# Patient Record
Sex: FEMALE | Race: BLACK OR AFRICAN AMERICAN | Employment: OTHER | URBAN - METROPOLITAN AREA
[De-identification: names, ages, dates, MRNs, and addresses within clinical notes are randomized per-mention and may not be internally consistent; named-entity substitution may affect disease eponyms.]

---

## 2017-04-17 RX ORDER — METOPROLOL TARTRATE 25 MG/1
TABLET, FILM COATED ORAL
Qty: 90 TAB | Refills: 6 | Status: SHIPPED | OUTPATIENT
Start: 2017-04-17 | End: 2017-04-19 | Stop reason: SDUPTHER

## 2017-04-19 RX ORDER — METOPROLOL TARTRATE 25 MG/1
TABLET, FILM COATED ORAL
Qty: 90 TAB | Refills: 3 | Status: SHIPPED | OUTPATIENT
Start: 2017-04-19 | End: 2017-07-06 | Stop reason: SDUPTHER

## 2017-06-26 RX ORDER — IRBESARTAN AND HYDROCHLOROTHIAZIDE 150; 12.5 MG/1; MG/1
TABLET, FILM COATED ORAL
Qty: 30 TAB | Refills: 0 | Status: SHIPPED | OUTPATIENT
Start: 2017-06-26 | End: 2017-07-06 | Stop reason: SDUPTHER

## 2017-06-26 NOTE — TELEPHONE ENCOUNTER
Contact # is [phone]    Patient's daughter, checking on status of patient's medication being filled. Scheduled appointment on July 6th to establish care with Dr Carina Amaya. Patient is completely out. Original request sent over on June 16th.

## 2017-07-05 PROBLEM — F02.80 ALZHEIMER'S DEMENTIA (HCC): Status: ACTIVE | Noted: 2017-07-05

## 2017-07-05 PROBLEM — G30.9 ALZHEIMER'S DEMENTIA (HCC): Status: ACTIVE | Noted: 2017-07-05

## 2017-07-05 PROBLEM — I65.23 CAROTID STENOSIS, BILATERAL: Status: ACTIVE | Noted: 2017-07-05

## 2017-07-06 ENCOUNTER — OFFICE VISIT (OUTPATIENT)
Dept: FAMILY MEDICINE CLINIC | Age: 82
End: 2017-07-06

## 2017-07-06 ENCOUNTER — HOSPITAL ENCOUNTER (OUTPATIENT)
Dept: LAB | Age: 82
Discharge: HOME | End: 2017-07-06
Payer: MEDICARE

## 2017-07-06 VITALS
WEIGHT: 100.2 LBS | BODY MASS INDEX: 18.44 KG/M2 | DIASTOLIC BLOOD PRESSURE: 80 MMHG | RESPIRATION RATE: 16 BRPM | OXYGEN SATURATION: 98 % | HEIGHT: 62 IN | HEART RATE: 64 BPM | SYSTOLIC BLOOD PRESSURE: 160 MMHG | TEMPERATURE: 98 F

## 2017-07-06 DIAGNOSIS — Z13.5 GLAUCOMA SCREENING: ICD-10-CM

## 2017-07-06 DIAGNOSIS — E03.9 UNSPECIFIED HYPOTHYROIDISM: ICD-10-CM

## 2017-07-06 DIAGNOSIS — R73.02 GLUCOSE INTOLERANCE (IMPAIRED GLUCOSE TOLERANCE): ICD-10-CM

## 2017-07-06 DIAGNOSIS — I65.23 CAROTID STENOSIS, BILATERAL: ICD-10-CM

## 2017-07-06 DIAGNOSIS — M81.0 OSTEOPOROSIS WITHOUT CURRENT PATHOLOGICAL FRACTURE, UNSPECIFIED OSTEOPOROSIS TYPE: ICD-10-CM

## 2017-07-06 DIAGNOSIS — I10 HYPERTENSION, UNCONTROLLED: ICD-10-CM

## 2017-07-06 DIAGNOSIS — D50.9 MICROCYTIC ANEMIA: ICD-10-CM

## 2017-07-06 DIAGNOSIS — R63.6 UNDERWEIGHT: ICD-10-CM

## 2017-07-06 DIAGNOSIS — F02.80 LATE ONSET ALZHEIMER'S DISEASE WITHOUT BEHAVIORAL DISTURBANCE (HCC): ICD-10-CM

## 2017-07-06 DIAGNOSIS — G30.1 LATE ONSET ALZHEIMER'S DISEASE WITHOUT BEHAVIORAL DISTURBANCE (HCC): ICD-10-CM

## 2017-07-06 DIAGNOSIS — N18.30 CHRONIC RENAL INSUFFICIENCY, STAGE III (MODERATE) (HCC): ICD-10-CM

## 2017-07-06 DIAGNOSIS — Z00.00 MEDICARE ANNUAL WELLNESS VISIT, SUBSEQUENT: Primary | ICD-10-CM

## 2017-07-06 LAB — RETICS/RBC NFR AUTO: 0.7 % (ref 0.6–2.6)

## 2017-07-06 PROCEDURE — 36415 COLL VENOUS BLD VENIPUNCTURE: CPT

## 2017-07-06 PROCEDURE — 85025 COMPLETE CBC W/AUTO DIFF WBC: CPT

## 2017-07-06 PROCEDURE — 80061 LIPID PANEL: CPT

## 2017-07-06 PROCEDURE — 83550 IRON BINDING TEST: CPT

## 2017-07-06 PROCEDURE — 80053 COMPREHEN METABOLIC PANEL: CPT

## 2017-07-06 PROCEDURE — 83036 HEMOGLOBIN GLYCOSYLATED A1C: CPT

## 2017-07-06 PROCEDURE — 84443 ASSAY THYROID STIM HORMONE: CPT

## 2017-07-06 RX ORDER — METOPROLOL TARTRATE 25 MG/1
TABLET, FILM COATED ORAL
Qty: 270 TAB | Refills: 3 | Status: SHIPPED | OUTPATIENT
Start: 2017-07-06 | End: 2018-08-27 | Stop reason: SDUPTHER

## 2017-07-06 RX ORDER — IRBESARTAN AND HYDROCHLOROTHIAZIDE 150; 12.5 MG/1; MG/1
TABLET, FILM COATED ORAL
Qty: 90 TAB | Refills: 3 | Status: SHIPPED | OUTPATIENT
Start: 2017-07-06 | End: 2018-07-06 | Stop reason: SDUPTHER

## 2017-07-06 NOTE — ACP (ADVANCE CARE PLANNING)
Discussed with Nathalie Tan. Quynh her ability to prepare and advance directive in the case that an injury or illness causes her to be unable to make health care decisions.    Doesn't have one, has discussed with family; gave her info about nurse navigator so she could do that if she wanted

## 2017-07-06 NOTE — PATIENT INSTRUCTIONS
(Preventive care checklist to be included in patient instructions)  Discussed today Done Previously Not Needed    X 13 rx given   Pneumococcal vaccines    x  Flu vaccine     x Hepatitis B vaccine (if at risk)   X rx given   Shingles vaccine   X rx given   TDAP vaccine    X decines  Mammogram     x Pap smear    x x Colorectal cancer screening     x Low-dose CT for lung cancer screening   X refer   Bone density test   X refer   Glaucoma screening   x   Cholesterol test   x   Diabetes screening test      x Diabetes self-management class     x Nutritionist referral for diabetes or renal disease     If you would like help making an advanced directive (living will), please call our office general number [phone]) and ask for Wendy Giles RN (nurse navigator). Your blood pressure should be less than 140/90 consistently (more than 90% of the time). Check it after sitting and resting for 10 minutes for an accurate result. Please call me if it is often running high. Well Visit, Over 72: Care Instructions  Your Care Instructions  Physical exams can help you stay healthy. Your doctor has checked your overall health and may have suggested ways to take good care of yourself. He or she also may have recommended tests. At home, you can help prevent illness with healthy eating, regular exercise, and other steps. Follow-up care is a key part of your treatment and safety. Be sure to make and go to all appointments, and call your doctor if you are having problems. It's also a good idea to know your test results and keep a list of the medicines you take. How can you care for yourself at home? · Reach and stay at a healthy weight. This will lower your risk for many problems, such as obesity, diabetes, heart disease, and high blood pressure. · Get at least 30 minutes of exercise on most days of the week. Walking is a good choice.  You also may want to do other activities, such as running, swimming, cycling, or playing tennis or team sports. · Do not smoke. Smoking can make health problems worse. If you need help quitting, talk to your doctor about stop-smoking programs and medicines. These can increase your chances of quitting for good. · Protect your skin from too much sun. When you're outdoors from 10 a.m. to 4 p.m., stay in the shade or cover up with clothing and a hat with a wide brim. Wear sunglasses that block UV rays. Even when it's cloudy, put broad-spectrum sunscreen (SPF 30 or higher) on any exposed skin. · See a dentist one or two times a year for checkups and to have your teeth cleaned. · Wear a seat belt in the car. · Limit alcohol to 2 drinks a day for men and 1 drink a day for women. Too much alcohol can cause health problems. Follow your doctor's advice about when to have certain tests. These tests can spot problems early. For men and women  · Cholesterol. Your doctor will tell you how often to have this done based on your overall health and other things that can increase your risk for heart attack and stroke. · Blood pressure. Have your blood pressure checked during a routine doctor visit. Your doctor will tell you how often to check your blood pressure based on your age, your blood pressure results, and other factors. · Diabetes. Ask your doctor whether you should have tests for diabetes. · Vision. Experts recommend that you have yearly exams for glaucoma and other age-related eye problems. · Hearing. Tell your doctor if you notice any change in your hearing. You can have tests to find out how well you hear. · Colon cancer tests. Keep having colon cancer tests as your doctor recommends. You can have one of several types of tests. · Heart attack and stroke risk. At least every 4 to 6 years, you should have your risk for heart attack and stroke assessed.  Your doctor uses factors such as your age, blood pressure, cholesterol, and whether you smoke or have diabetes to show what your risk for a heart attack or stroke is over the next 10 years. · Osteoporosis. Talk to your doctor about whether you should have a bone density test to find out whether you have thinning bones. Also ask your doctor about whether you should take calcium and vitamin D supplements. For women  · Pap test and pelvic exam. You may no longer need a Pap test. Talk with your doctor about whether to stop or continue to have Pap tests. · Breast exam and mammogram. Ask how often you should have a mammogram, which is an X-ray of your breasts. A mammogram can spot breast cancer before it can be felt and when it is easiest to treat. · Thyroid disease. Talk to your doctor about whether to have your thyroid checked as part of a regular physical exam. Women have an increased chance of a thyroid problem. For men  · Prostate exam. Talk to your doctor about whether you should have a blood test (called a PSA test) for prostate cancer. Experts disagree on whether men should have this test. Some experts recommend that you discuss the benefits and risks of the test with your doctor. · Abdominal aortic aneurysm. Ask your doctor whether you should have a test to check for an aneurysm. You may need a test if you ever smoked or if your parent, brother, sister, or child has had an aneurysm. When should you call for help? Watch closely for changes in your health, and be sure to contact your doctor if you have any problems or symptoms that concern you. Where can you learn more? Go to http://rocael-chyna.info/. Enter U231 in the search box to learn more about \"Well Visit, Over 65: Care Instructions. \"  Current as of: July 19, 2016  Content Version: 11.3  © 9956-7234 [employer]. Care instructions adapted under license by [employer] (which disclaims liability or warranty for this information).  If you have questions about a medical condition or this instruction, always ask your healthcare professional. [employer] disclaims any warranty or liability for your use of this information.

## 2017-07-06 NOTE — PROGRESS NOTES
Chief Complaint   Patient presents with    Establish Care    Hypertension    Thyroid Problem     Pt here to follow up on htn and thyroid, also to establish care  Med list reviewed  1. Have you been to the ER, urgent care clinic since your last visit? Hospitalized since your last visit? No    2. Have you seen or consulted any other health care providers outside of the [address] [place] [address] since your last visit? Include any pap smears or colon screening.  No  \"REVIEWED RECORD IN PREPARATION FOR VISIT AND HAVE OBTAINED THE NECESSARY DOCUMENTATION\"

## 2017-07-06 NOTE — PROGRESS NOTES
Nuno Grier [address] [facility]  [id number] [place] [address] [address] Maur[place], [address] [facility]  [phone]           Date of visit: 7/6/2017       This is an Subsequent Medicare Annual Wellness Visit (AWV)    I have reviewed the patient's medical history in detail and updated the computerized patient record. Alexandro Doshi is a 80 y.o. female   History obtained from: patient, daughter. Concerns today   -feeling well but needs refill bp meds and thyroid checked  Stable thyroid dose for years  She has no concerns  They don't check her bp but she does take the med  Has a family member who is a nurse who could start checking bp some  -has lost 3 pounds. Doesn't eat much. Daughter wondered if due to thyroid med  -only 2 meals per day, and they are small, just not a lot of appetite  Weight loss very gradual over several years.  Denies any GI symptoms  Daughter says pt's memory slowly worsening but not a big problem at this point, no behavioral problems/sundowning, still very self-sufficient    History     Patient Active Problem List   Diagnosis Code    Unspecified hypothyroidism E03.9    Microcytic anemia D50.9    Chronic renal insufficiency, stage III (moderate) N18.3    Hypertension, uncontrolled I10    Kyphosis (acquired) (postural) M40.00    Glucose intolerance (impaired glucose tolerance) R73.02    Alzheimer's dementia G30.9    Carotid stenosis, bilateral I65.23     Past Medical History:   Diagnosis Date    Anemia of chronic disease 11/03    [place]-hem    Carotid artery disease ([address]     repeat dopplers 3/14    Dehydration     Goiter     s/p thyroidectomy, benign    Lumbar disc disease     ruptured disc L4-L5 s/p epidural  Hernando/os    Osteoporosis, unspecified 12/03    Pure hypercholesterolemia 11/03    Unspecified essential hypertension       Past Surgical History:   Procedure Laterality Date    HX COLONOSCOPY      PUCT/ASPIR BREAST CYST,EACH ADDN  2/04    right breast    THYROIDECTOMY  5/10 benign goiter  Brown/surg     No Known Allergies  Current Outpatient Prescriptions   Medication Sig Dispense Refill    irbesartan-hydroCHLOROthiazide (AVALIDE) 150-12.5 mg per tablet TAKE ONE TABLET BY MOUTH EVERY DAY 90 Tab 3    metoprolol tartrate (LOPRESSOR) 25 mg tablet TAKE ONE TABLET BY MOUTH EVERY DAY IN THE MORNING AND TAKE TWO TABLETS BY MOUTH EVERY DAY IN THE EVENING 270 Tab 3    levothyroxine (SYNTHROID) 50 mcg tablet TAKE ONE TABLET BY MOUTH ONCE DAILY 30 Tab 11    aspirin delayed-release (ASPIR-81) 81 mg tablet Take 81 mg by mouth daily. Family History   Problem Relation Age of Onset    Hypertension Mother     Hypertension Father      Social History   Substance Use Topics    Smoking status: Never Smoker    Smokeless tobacco: Never Used    Alcohol use No       Specialists/Care Team   [employer] has established care with the following healthcare providers:  Patient Care Team:  Dasha Dubon MD as PCP - General (Family Practice)  Justino Perez MD (Cardiology)  Aliza Adler MD (Neurology)  Bhupinder aFye MD (Vascular Surgery)  Does not have eye doc but willing to be referred, has been a few years    [address]     Demographics   female  80 y.o. General Health Questions   -During the past 4 weeks:   -how would you rate your health in general? Good   -how often have you been bothered by feeling dizzy when standing up? never   -how much have you been bothered by bodily pain? not   -Have you noticed any hearing difficulties? no   -has your physical and emotional health limited your social activities with family or friends?  Not really, stays home, been a  for a long time    Emotional Health Questions   -Do you have a history of depression, anxiety, or emotional problems? no  -Over the past 2 weeks, have you felt down, depressed or hopeless? no  -Over the past 2 weeks, have you felt little interest or pleasure in doing things? no    Health Habits Please describe your diet habits: cereal, chicken, salads, french fries, candy, not as many fruits as she used to, drinks water or sprite, no multivitamin but could add  Do you get 5 servings of fruits or vegetables daily? no  Do you exercise regularly? no    Activities of Daily Living and Functional Status   -Do you need help with eating, walking, dressing, bathing, toileting, the phone, transportation, shopping, preparing meals, housework, laundry, medications or managing money? no  -In the past four weeks, was someone available to help you if you needed and wanted help with anything? yes  -Are you confident are you that you can control and manage most of your health problems? yes  -Have you been given information to help you keep track of your medications? yes  -How often do you have trouble taking your medications as prescribed? never    Fall Risk and Home Safety   Have you fallen 2 or more times in the past year? no  Does your home have rugs in the hallway, lack grab bars in the bathroom, lack handrails on the stairs or have poor lighting? no  Do you have smoke detectors and check them regularly? yes  Do you have difficulties driving a car? Doesn't drive  Do you always fasten your seat belt when you are in a car? yes    Review of Systems (if indicated for problems addressed today)   Card: denies chest pain or palpitations  Pulm: denies shortness of breath  GI: denies hematochezia     Physical Examination     Vitals:    07/06/17 0821 07/06/17 0858   BP: 165/63 160/80   Pulse: 64    Resp: 16    Temp: 98 °F (36.7 °C)    TempSrc: Oral    SpO2: 98%    Weight: 100 lb 3.2 oz (45.5 kg)    Height: 5' 2.21\" (1.58 m)      Body mass index is 18.21 kg/(m^2).    No exam data present  Was the patient's timed Up & Go test unsteady or longer than 30 seconds? no    Evaluation of Cognitive Function   Mood/affect:  happy  Orientation: knows person, state,difficulty recalling state/city and month but finally got it  Appearance: age appropriate  Family member/caregiver input: memory slowly getting worse, per daughter    Additional exam if indicated for problems addressed today:  General: stated age, well developed, thin and in NAD  Neck: supple, symmetrical, trachea midline, no adenopathy and thyroid: not enlarged, symmetric, no tenderness/mass/nodules  Lungs:  clear to auscultation w/o rales, rhonchi, wheezes w/normal effort and no use of accessory muscles of respiration   Heart: regular rate and rhythm, S1, S2 normal, no murmur, click, rub or gallop  Abdomen: soft, nontender, no masses  Ext:  No edema noted. Lymph: no cervical adenopathy appreciated  Skin:  Normal. and no rash or abnormalities   Psych: alert and happy affect, and Speech: appropriate quality, quantity and organization of sentences      Advice/Referrals/Counseling (as indicated)   Education and counseling provided for any problems identified above: more fats in diet, encouraged snacking on nuts, peanut butter, adding oils to foods, perhaps adding ensure or boost or milkshakes and a multivitamin. Discussed doing exercises at home to help balance and strength, perhaps a chair exercise video    Preventive Services     (Preventive care checklist to be included in patient instructions)  Discussed today Done Previously Not Needed    X 13 rx given   Pneumococcal vaccines    x  Flu vaccine     x Hepatitis B vaccine (if at risk)   X rx given   Shingles vaccine   X rx given   TDAP vaccine    X decines  Mammogram     x Pap smear    x x Colorectal cancer screening     x Low-dose CT for lung cancer screening   X refer   Bone density test   X refer   Glaucoma screening   x   Cholesterol test   x   Diabetes screening test      x Diabetes self-management class     x Nutritionist referral for diabetes or renal disease     Discussion of Advance Directive   Discussed with Conner Stokes.  Quynh her ability to prepare and advance directive in the case that an injury or illness causes her to be unable to make health care decisions. Doesn't have one, has discussed with family; gave her info about nurse navigator so she could do that if she wanted    Assessment/Plan   Z00.00    ICD-10-CM ICD-9-CM    1. Medicare annual wellness visit, subsequent Z00.00 V70.0    2. Unspecified hypothyroidism E03.9 244.9 TSH 3RD GENERATION   3. Hypertension, uncontrolled L08 773.0 METABOLIC PANEL, COMPREHENSIVE      LIPID PANEL      irbesartan-hydroCHLOROthiazide (AVALIDE) 150-12.5 mg per tablet   4. Late onset Alzheimer's disease without behavioral disturbance G30.1 331.0     F02.80 294.10    5. Glucose intolerance (impaired glucose tolerance) R73.02 790.22 HEMOGLOBIN A1C WITH EAG   6. Microcytic anemia D50.9 280.9 CBC WITH AUTOMATED DIFF      RETICULOCYTE COUNT      IRON PROFILE   7. Chronic renal insufficiency, stage III (moderate) N18.3 585.3    8. Carotid stenosis, bilateral I65.23 433.10      433.30    9. Glaucoma screening Z13.5 V80.1 REFERRAL TO OPHTHALMOLOGY   10. Underweight R63.6 783.22    11.  Osteoporosis without current pathological fracture, unspecified osteoporosis type M81.0 733.00 DEXA BONE DENSITY STUDY AXIAL       Orders Placed This Encounter    DEXA BONE DENSITY STUDY AXIAL    CBC WITH AUTOMATED DIFF    METABOLIC PANEL, COMPREHENSIVE    LIPID PANEL    HEMOGLOBIN A1C WITH EAG    TSH 3RD GENERATION    RETICULOCYTE COUNT    IRON PROFILE    REFERRAL TO OPHTHALMOLOGY    DISCONTD: Beatrice Hoops,, Tetanus (BOOSTRIX TDAP) 2.5-8-5 Lf-mcg-Lf/0.5mL susp susp    DISCONTD: varicella zoster vacine live (VARICELLA-ZOSTER VACINE LIVE) 19,400 unit/0.65 mL susr injection    DISCONTD: pneumococcal 13 mina conj dip (PREVNAR-13) 0.5 mL syrg injection    irbesartan-hydroCHLOROthiazide (AVALIDE) 150-12.5 mg per tablet    metoprolol tartrate (LOPRESSOR) 25 mg tablet     HTN not controlled here but will monitor at home and let us know, she has family member who is niece who can check it once weekly  Osteoporosis needs recheck, did take fosamax over 5 years but that was many years ago  Weight loss/underweight still a problem, needs to eat more, discussed ways to add calories and will recheck in 3 months  Recheck anemia with labs  Dementia gradually progressing but not interfering with life much at this point    Follow-up Disposition:  Return in about 3 months (around 10/6/2017).     Odell Mcpherson MD

## 2017-07-07 LAB
ALBUMIN SERPL-MCNC: 4.2 G/DL (ref 3.5–4.8)
ALBUMIN/GLOB SERPL: 1.4 {RATIO} (ref 1.2–2.2)
ALP SERPL-CCNC: 64 IU/L (ref 39–117)
ALT SERPL-CCNC: 12 IU/L (ref 0–32)
AST SERPL-CCNC: 26 IU/L (ref 0–40)
BASOPHILS # BLD AUTO: 0 X10E3/UL (ref 0–0.2)
BASOPHILS NFR BLD AUTO: 1 %
BILIRUB SERPL-MCNC: 0.4 MG/DL (ref 0–1.2)
BUN SERPL-MCNC: 21 MG/DL (ref 8–27)
BUN/CREAT SERPL: 15 (ref 12–28)
CALCIUM SERPL-MCNC: 9 MG/DL (ref 8.7–10.3)
CHLORIDE SERPL-SCNC: 98 MMOL/L (ref 96–106)
CHOLEST SERPL-MCNC: 280 MG/DL (ref 100–199)
CO2 SERPL-SCNC: 24 MMOL/L (ref 18–29)
COMMENT, 011824: ABNORMAL
CREAT SERPL-MCNC: 1.37 MG/DL (ref 0.57–1)
EOSINOPHIL # BLD AUTO: 0.1 X10E3/UL (ref 0–0.4)
EOSINOPHIL NFR BLD AUTO: 2 %
ERYTHROCYTE [DISTWIDTH] IN BLOOD BY AUTOMATED COUNT: 15.1 % (ref 12.3–15.4)
EST. AVERAGE GLUCOSE BLD GHB EST-MCNC: 117 MG/DL
GLOBULIN SER CALC-MCNC: 3 G/DL (ref 1.5–4.5)
GLUCOSE SERPL-MCNC: 97 MG/DL (ref 65–99)
HBA1C MFR BLD: 5.7 % (ref 4.8–5.6)
HCT VFR BLD AUTO: 31.4 % (ref 34–46.6)
HDLC SERPL-MCNC: 54 MG/DL
HGB BLD-MCNC: 9.8 G/DL (ref 11.1–15.9)
IMM GRANULOCYTES # BLD: 0 X10E3/UL (ref 0–0.1)
IMM GRANULOCYTES NFR BLD: 0 %
INTERPRETATION, 910389: NORMAL
INTERPRETATION: NORMAL
IRON SATN MFR SERPL: 26 % (ref 15–55)
IRON SERPL-MCNC: 68 UG/DL (ref 27–139)
LDLC SERPL CALC-MCNC: 192 MG/DL (ref 0–99)
LYMPHOCYTES # BLD AUTO: 1.2 X10E3/UL (ref 0.7–3.1)
LYMPHOCYTES NFR BLD AUTO: 35 %
MCH RBC QN AUTO: 23.6 PG (ref 26.6–33)
MCHC RBC AUTO-ENTMCNC: 31.2 G/DL (ref 31.5–35.7)
MCV RBC AUTO: 76 FL (ref 79–97)
MONOCYTES # BLD AUTO: 0.3 X10E3/UL (ref 0.1–0.9)
MONOCYTES NFR BLD AUTO: 9 %
NEUTROPHILS # BLD AUTO: 1.8 X10E3/UL (ref 1.4–7)
NEUTROPHILS NFR BLD AUTO: 53 %
PDF IMAGE, 910387: NORMAL
PLATELET # BLD AUTO: 209 X10E3/UL (ref 150–379)
POTASSIUM SERPL-SCNC: 4.7 MMOL/L (ref 3.5–5.2)
PROT SERPL-MCNC: 7.2 G/DL (ref 6–8.5)
RBC # BLD AUTO: 4.15 X10E6/UL (ref 3.77–5.28)
SODIUM SERPL-SCNC: 140 MMOL/L (ref 134–144)
TIBC SERPL-MCNC: 265 UG/DL (ref 250–450)
TRIGL SERPL-MCNC: 168 MG/DL (ref 0–149)
TSH SERPL DL<=0.005 MIU/L-ACNC: 4.88 UIU/ML (ref 0.45–4.5)
UIBC SERPL-MCNC: 197 UG/DL (ref 118–369)
VLDLC SERPL CALC-MCNC: 34 MG/DL (ref 5–40)
WBC # BLD AUTO: 3.4 X10E3/UL (ref 3.4–10.8)

## 2017-07-07 RX ORDER — LEVOTHYROXINE SODIUM 75 UG/1
75 TABLET ORAL
Qty: 90 TAB | Refills: 1 | Status: SHIPPED | OUTPATIENT
Start: 2017-07-07 | End: 2018-02-26 | Stop reason: SDUPTHER

## 2017-07-07 RX ORDER — ATORVASTATIN CALCIUM 20 MG/1
20 TABLET, FILM COATED ORAL
Qty: 90 TAB | Refills: 3 | Status: SHIPPED | OUTPATIENT
Start: 2017-07-07 | End: 2018-09-04 | Stop reason: SDUPTHER

## 2017-07-07 NOTE — PROGRESS NOTES
MJ,  Would you let her daughter know I called in new thyroid dose (75mcg, higher dose) and a cholesterol pill as her cholesterol is extremely high? Thanks! Sent in letter:  Thyroid is slightly off--I recommend we increase your dose to 75mcg. You should follow up with me in 2-3 months so we can recheck the lab and see how you are feeling on the higher dose. Cholesterol is extremely high--I recommend you start a statin medication to help prevent heart attacks or strokes. I will call in atorvastatin; most people don't get side effects, but muscle aches or bowel changes are a possibility. Please let me know if there are any problems with this medication. Kidney lab is elevated but stable. The low blood counts seem to be due to the mild kidney diease; iron levels were normal. Liver and electrolytes were fine. You have very mild pre-diabetes, which is not a problem.

## 2017-07-21 ENCOUNTER — CLINICAL SUPPORT (OUTPATIENT)
Dept: CARDIOLOGY CLINIC | Age: 82
End: 2017-07-21

## 2017-07-21 DIAGNOSIS — I34.0 NONRHEUMATIC MITRAL (VALVE) INSUFFICIENCY: Primary | ICD-10-CM

## 2017-07-28 ENCOUNTER — OFFICE VISIT (OUTPATIENT)
Dept: CARDIOLOGY CLINIC | Age: 82
End: 2017-07-28

## 2017-07-28 VITALS
HEART RATE: 65 BPM | SYSTOLIC BLOOD PRESSURE: 160 MMHG | WEIGHT: 100.8 LBS | HEIGHT: 62 IN | DIASTOLIC BLOOD PRESSURE: 80 MMHG | OXYGEN SATURATION: 98 % | BODY MASS INDEX: 18.55 KG/M2

## 2017-07-28 DIAGNOSIS — I10 HYPERTENSION, UNCONTROLLED: Primary | ICD-10-CM

## 2017-07-28 NOTE — PROGRESS NOTES
HISTORY OF PRESENT ILLNESS  Sara Kumar is a 80 y.o. female. Patient with h/o HTN, HLD and hypothyroidism, here for evaluation of syncope  holter on 4/15: The average heart rate, excluding ectopy, was 72 BPM with a minimum of 48 BPM  at 02:41D2 and a maximum of 124 BPM at 06:18D2. Heart beats, including ectopy, totaled 110047 beats. VENTRICULAR ECTOPICS totaled 218 averaging 9.3 per hour with 218 single, 0  paired, 0 trigeminy and 0 R on T.    SUPRAVENTRICULAR ECTOPICS totaled 42 averaging 1.8 per hour ,with 9 single  and 2 paired beats. There was 1 SUPRAVENTRICULAR TACHYCARDIA with 31 beats at 19:30D1 at a rate  of 129 BPM.  1. Rhythm is sinus. 2. MI and QRS are within normal limits. 3. (218) single ve''s.  4. (9) single sve''s, (2) paired, (1) run of svt. CArotids on 4/15: moderate b/l stenosis followed by Dr. Jayne Grossman  Mild dementia and forgetfulness  NUCLEAR STRESS TEST on 6/15 no ischemia or MI and EF 80%  Echo on 6/15: EF 70%, moderate MR, mild to moderate TR and mild AI  ECHO on 7/17:Systolic function was normal. Ejection fraction was  estimated to be 65 %. There were no regional wall motion abnormalities. Left atrium: The atrium was moderately dilated. Right atrium: The atrium was mildly to moderately dilated. Mitral valve: There was mild annular calcification. There was moderate to  severe regurgitation. pisa 0.6cm    Aortic valve: The valve was trileaflet. Leaflets exhibited sclerosis  without stenosis. There was mild regurgitation. PHT measured 683 ms. Tricuspid valve: There was moderate regurgitation. Pulmonary artery  systolic pressure: 45 mmHg. There was mild to moderate pulmonary  hypertension. Pulmonic valve: There was mild to nearing moderate regurgitation. COMPARISONS:  In comparison with a previous study dated 6/26/2015, there has been no  significant change.         Past Medical History     Diagnosis   Date        Unspecified essential hypertension           Anemia of chronic disease   11/03        Андрей Hamm        Pure hypercholesterolemia   11/03        Osteoporosis, unspecified   12/03        Goiter              s/p thyroidectomy, benign        Carotid artery disease ([address]              repeat dopplers 3/14        Lumbar disc disease              ruptured disc L4-L5 s/p epidural Hernando/os        Dehydration                   Past Surgical History     Procedure   Laterality   Date        Pr puct/aspir breast cyst,each addn      2/04           right breast        Pr thyroidectomy      5/10           benign goiter Brown/surg         HPI  No complaints of sob or cp or syncope or palpitations  Review of Systems   Respiratory: Negative. Cardiovascular: Negative. Visit Vitals    /80 (BP 1 Location: Right arm, BP Patient Position: Sitting)    Pulse 65    Ht 5' 2.21\" (1.58 m)    Wt 45.7 kg (100 lb 12.8 oz)    SpO2 98%    BMI 18.31 kg/m2         Physical Exam   Neck: No JVD present. Carotid bruit is present. Cardiovascular: Normal rate and regular rhythm. Murmur heard. Systolic murmur is present with a grade of 1/6   Pulmonary/Chest: Effort normal and breath sounds normal.   Abdominal: Soft. Musculoskeletal: She exhibits no edema. Psychiatric: She has a normal mood and affect. Current Outpatient Prescriptions on File Prior to Visit   Medication Sig Dispense Refill    levothyroxine (SYNTHROID) 75 mcg tablet Take 1 Tab by mouth Daily (before breakfast). Increased dose 90 Tab 1    atorvastatin (LIPITOR) 20 mg tablet Take 1 Tab by mouth nightly. 90 Tab 3    irbesartan-hydroCHLOROthiazide (AVALIDE) 150-12.5 mg per tablet TAKE ONE TABLET BY MOUTH EVERY DAY 90 Tab 3    metoprolol tartrate (LOPRESSOR) 25 mg tablet TAKE ONE TABLET BY MOUTH EVERY DAY IN THE MORNING AND TAKE TWO TABLETS BY MOUTH EVERY DAY IN THE EVENING 270 Tab 3    aspirin delayed-release (ASPIR-81) 81 mg tablet Take 81 mg by mouth daily.        No current facility-administered medications on file prior to visit. ASSESSMENT and PLAN  SYNCOPE: no recurrence. cardiac w/u discussed with patient. I suspect syncope previously  related to medications effect (? Doxazosin) and mild dehydration. continue off doxazosin. Continue with metoprolol and avapro. Encouraged her as well to stay well hydrated given also small lv cavity and hyperdynamic LV function  SVT: no further intervention.  Continue medical rx only with metoprolol unless of recurrent issues  HTN: a bit elevated but I am hesitant to increase medications in this almost [de-identified] yo lady , her daughter also tells me that BP at home checked by niece who is RN it is always <140 , she will let me know if higher and will increase avapro  HLD: closely followed by her PCP  MR: discussed significance, continue with echo surveillance possibly in 1 year (7/18) unless of occurring symptoms  Carotids: closely followed by Dr. Kalpana Olivo  Otherwise see her back in 6 months

## 2017-07-28 NOTE — MR AVS SNAPSHOT
Visit Information Date & Time Provider Department Dept. Phone Encounter #  
 7/28/2017  9:20 AM Kellee Pro MD [facility] [phone] [id number] Follow-up Instructions Return in about 6 months (around 1/28/2018). Follow-up and Disposition History Upcoming Health Maintenance Date Due DTaP/Tdap/Td series (1 - Tdap) 7/29/1958 ZOSTER VACCINE AGE 60> 5/29/1997 GLAUCOMA SCREENING Q2Y 7/29/2002 INFLUENZA AGE 9 TO ADULT 8/1/2017 MEDICARE YEARLY EXAM 7/7/2018 Allergies as of 7/28/2017  Review Complete On: 7/28/2017 By: Kellee Pro MD  
 No Known Allergies Current Immunizations  Never Reviewed Name Date Influenza Vaccine Split 1/4/2011 Pneumococcal Polysaccharide (PPSV-23) 2/20/2014 Pneumococcal Vaccine (Pcv) 11/14/2002 Not reviewed this visit You Were Diagnosed With   
  
 Codes Comments Hypertension, uncontrolled    -  Primary ICD-10-CM: I10 
ICD-9-CM: 401.9 Vitals BP Pulse Height(growth percentile) Weight(growth percentile) SpO2 BMI  
 160/80 (BP 1 Location: Right arm, BP Patient Position: Sitting) 65 5' 2.21\" (1.58 m) 100 lb 12.8 oz (45.7 kg) 98% 18.31 kg/m2 OB Status Smoking Status Postmenopausal Never Smoker Vitals History BMI and BSA Data Body Mass Index Body Surface Area  
 18.31 kg/m 2 1.42 m 2 Preferred Pharmacy Pharmacy Name Phone [address], [address] Your Updated Medication List  
  
   
This list is accurate as of: 7/28/17 10:18 AM.  Always use your most recent med list.  
  
  
  
  
 ASPIR-81 81 mg tablet Generic drug:  aspirin delayed-release Take 81 mg by mouth daily. atorvastatin 20 mg tablet Commonly known as:  LIPITOR Take 1 Tab by mouth nightly. irbesartan-hydroCHLOROthiazide 150-12.5 mg per tablet Commonly known as:  AVALIDE  
TAKE ONE TABLET BY MOUTH EVERY DAY  
  
 levothyroxine 75 mcg tablet Commonly known as:  SYNTHROID Take 1 Tab by mouth Daily (before breakfast). Increased dose  
  
 metoprolol tartrate 25 mg tablet Commonly known as:  LOPRESSOR  
TAKE ONE TABLET BY MOUTH EVERY DAY IN THE MORNING AND TAKE TWO TABLETS BY MOUTH EVERY DAY IN THE EVENING We Performed the Following AMB POC EKG ROUTINE W/ 12 LEADS, INTER & REP [33361 CPT(R)] Follow-up Instructions Return in about 6 months (around 1/28/2018). To-Do List   
 08/01/2017 8:00 AM  
  Appointment with [address] [place] [address] DEXA 1 at [address] [place] [address] ([phone]) Please, no calcium supplements or antacids that coat the stomach (ex: Tums, Mylanta) 24 hours prior to procedure. Maintain normal diet and medications. Dairy products are allowed. Wear an outfit with an elastic waistband (no zipper or metal snaps). Check in at registration 15min before your appointment time unless you were instructed to do otherwise. Patient Instructions Follow up with Dr. Pierre Ervin in 6 months. Introducing [facility] & HEALTH SERVICES! Dear Paul Flores: 
Thank you for requesting a [employer] account. Our records indicate that you have previously registered for a [employer] account but its currently inactive. Please call our [employer] support line at [phone]. Additional Information If you have questions, please visit the Frequently Asked Questions section of the [employer] website at https://[employer]. [employer]. [employer]/. Remember, Pix[employer] is NOT to be used for urgent needs. For medical emergencies, dial 911. Now available from your iPhone and Android! Please provide this summary of care documentation to your next provider. Your primary care clinician is listed as Marvin Leon. If you have any questions after today's visit, please call [phone].

## 2017-08-01 ENCOUNTER — HOSPITAL ENCOUNTER (OUTPATIENT)
Dept: MAMMOGRAPHY | Age: 82
Discharge: HOME | End: 2017-08-01
Attending: FAMILY MEDICINE
Payer: MEDICARE

## 2017-08-01 DIAGNOSIS — M81.0 OSTEOPOROSIS WITHOUT CURRENT PATHOLOGICAL FRACTURE, UNSPECIFIED OSTEOPOROSIS TYPE: ICD-10-CM

## 2017-08-01 DIAGNOSIS — M81.0 AGE-RELATED OSTEOPOROSIS WITHOUT CURRENT PATHOLOGICAL FRACTURE: Primary | ICD-10-CM

## 2017-08-01 PROCEDURE — 77080 DXA BONE DENSITY AXIAL: CPT

## 2017-08-01 RX ORDER — ALENDRONATE SODIUM 70 MG/1
70 TABLET ORAL
Qty: 13 TAB | Refills: 3 | Status: SHIPPED | OUTPATIENT
Start: 2017-08-01 | End: 2018-08-21 | Stop reason: ALTCHOICE

## 2017-08-01 NOTE — PROGRESS NOTES
Please let her know osteoporosis is pretty bad, I would recommend she go back on fosamax if she can take it with a full glass of water and remain upright for an hour. If that would be hard to do, I could order reclast instead.  Carey Ayoub MD 8/1/2017 6:11 PM

## 2017-08-07 ENCOUNTER — TELEPHONE (OUTPATIENT)
Dept: FAMILY MEDICINE CLINIC | Age: 82
End: 2017-08-07

## 2017-08-07 NOTE — TELEPHONE ENCOUNTER
[facility]  [phone]    Patient's daughter, Jessica Myers, states that it is ok for DIGNA to leave a detailed message on her vm. She is returning DIGNA's call.

## 2017-08-07 NOTE — PROGRESS NOTES
Informed dtr of results. Dtr may check into the reclast but the pt did fine on the fosamax before. She will let us know if they want to change it.

## 2018-07-06 DIAGNOSIS — I10 HYPERTENSION, UNCONTROLLED: ICD-10-CM

## 2018-07-06 RX ORDER — IRBESARTAN AND HYDROCHLOROTHIAZIDE 150; 12.5 MG/1; MG/1
TABLET, FILM COATED ORAL
Qty: 90 TAB | Refills: 0 | Status: SHIPPED | OUTPATIENT
Start: 2018-07-06 | End: 2018-10-07 | Stop reason: SDUPTHER

## 2018-08-21 ENCOUNTER — OFFICE VISIT (OUTPATIENT)
Dept: NEUROLOGY | Age: 83
End: 2018-08-21

## 2018-08-21 VITALS
DIASTOLIC BLOOD PRESSURE: 90 MMHG | HEART RATE: 82 BPM | HEIGHT: 62 IN | OXYGEN SATURATION: 98 % | WEIGHT: 99 LBS | SYSTOLIC BLOOD PRESSURE: 140 MMHG | RESPIRATION RATE: 14 BRPM | BODY MASS INDEX: 18.22 KG/M2

## 2018-08-21 DIAGNOSIS — F02.80 MIXED ALZHEIMER'S AND VASCULAR DEMENTIA (HCC): Primary | ICD-10-CM

## 2018-08-21 DIAGNOSIS — G30.9 MIXED ALZHEIMER'S AND VASCULAR DEMENTIA (HCC): Primary | ICD-10-CM

## 2018-08-21 DIAGNOSIS — F01.50 MIXED ALZHEIMER'S AND VASCULAR DEMENTIA (HCC): Primary | ICD-10-CM

## 2018-08-21 RX ORDER — DONEPEZIL HYDROCHLORIDE 5 MG/1
5 TABLET, FILM COATED ORAL
Qty: 30 TAB | Refills: 0 | Status: SHIPPED | OUTPATIENT
Start: 2018-08-21 | End: 2019-09-27

## 2018-08-21 NOTE — PROGRESS NOTES
Chief Complaint   Patient presents with    Memory Loss         HISTORY OF PRESENT ILLNESS  Deidra Lagos came back for follow-up. She was last seen almost 3 years ago and was diagnosed with the Alzheimer's type dementia. She was prescribed donepezil which she took for a day or 2 and discontinued it as it was thought to have caused diarrhea. She came in along with her daughter with whom she lives. Daughter tells me that she has continued to decline and now she is very forgetful, barely able to have conversations. She still able to do basic activities of daily living and is often alone during the day when she works. No behavioral issues. Denies any pacing at night. No hallucinations, tremors or falls. She does not drive. Current Outpatient Prescriptions   Medication Sig    donepezil (ARICEPT) 5 mg tablet Take 1 Tab by mouth nightly.  irbesartan-hydroCHLOROthiazide (AVALIDE) 150-12.5 mg per tablet TAKE 1 TABLET BY MOUTH EVERY DAY    levothyroxine (SYNTHROID) 75 mcg tablet Take 1 Tab by mouth Daily (before breakfast). NEEDS APPT    atorvastatin (LIPITOR) 20 mg tablet Take 1 Tab by mouth nightly.  metoprolol tartrate (LOPRESSOR) 25 mg tablet TAKE ONE TABLET BY MOUTH EVERY DAY IN THE MORNING AND TAKE TWO TABLETS BY MOUTH EVERY DAY IN THE EVENING    aspirin delayed-release (ASPIR-81) 81 mg tablet Take 81 mg by mouth daily. No current facility-administered medications for this visit. PHYSICAL EXAMINATION:    Visit Vitals    /90    Pulse 82    Resp 14    Ht 5' 2\" (1.575 m)    Wt 44.9 kg (99 lb)    SpO2 98%    BMI 18.11 kg/m2       NEUROLOGICAL EXAMINATION:     Mental Status:   Alert and oriented to person, place, and time. She scored 5/30 on [employer] cognitive assessment. She has significant impairment in all aspects of cognitive functioning    Cranial Nerves:    II, III, IV, VI:  Visual acuity grossly intact.  Visual fields are normal.    Pupils are equal, round, and reactive to light and accommodation. Extra-ocular movements are full and fluid. V-XII: Hearing is grossly intact. Facial features are symmetric, with normal sensation and strength. The palate rises symmetrically and the tongue protrudes midline. Sternocleidomastoids 5/5. Motor Examination: Normal tone, bulk, and strength. No cogwheel rigidity or clonus present. Sensory exam:  Normal throughout       Coordination:  Finger to nose and rapid arm movement testing was normal.   No resting or intention tremor    Gait and Station:  Steady. Normal arm swing. No Rhomberg or pronator drift. No muscle wasting or fasiculations noted. Reflexes:  DTRs 2+ throughout. Toes downgoing. LABS / IMAGING  Lab Results   Component Value Date/Time    TSH 4.880 (H) 07/06/2017 09:19 AM     Lab Results   Component Value Date/Time    Vitamin B12 351 06/29/2016 09:10 AM    Folate 43.7 (H) 04/23/2013 09:33 AM     CT Results (most recent):    Results from [place] encounter on 06/29/16   CT HEAD WO CONT   Narrative **Final Report**      ICD Codes / Adm. Diagnosis: 780.93  R41.3 / Memory loss  Other amnesia  Examination:  CT HEAD WO CON  - [id number] - Jun 29 2016 10:27AM  Accession No:  [id number]  Reason:  Memory loss      REPORT:  EXAM:  CT HEAD WO CON  INDICATION:  Memory loss, R 41.3  TECHNIQUE: Thin axial images were obtained through the calvarium and saved   with standard and bone window algorithm. Coronal and sagittal   reconstructions were generated. CT dose reduction was achieved through use   of a standardized protocol tailored for this examination and automatic   exposure control for dose modulation. COMPARISON: None available. FINDINGS:  Mild prominence of ventricles and sulci consistent with cerebral volume loss   within the limits of normal for age.    Multifocal and confluent decreased attenuation in the cerebral white matter   suggesting chronic small vessel type ischemic change. Additionally, focal encephalomalacia in the left thalamus and right head of   the caudate nucleus consistent with old lacunar infarcts. No evidence of intracranial hemorrhage, acute infarct, mass or abnormal   extra-axial fluid collections. Visualized osseous structures of the calvarium and skull base including   paranasal sinuses are unremarkable. IMPRESSION:   1. 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 2. No acute intracranial abnormality demonstrated. 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 Misty Browning has severe dementia. There is significant impairment of all aspects of cognitive functioning . Fortunately, she is living in a supervised situation but I discussed with daughter that it would be unsafe for her to be by herself  We discussed different memory care facilities and the option of adult  which would also help her stay physically and mentally active  We will retry her on donepezil and if she develops GI issues, will consider memantine  Treatment essentially is supportive      Ania Castellanos MD  Diplomate, American Board of Psychiatry & Neurology (Neurology)  Diplomate, American Board of Psychiatry & Neurology (Clinical Neurophysiology)  Diplomate, American Board of Electrodiagnostic Medicine                  Came back for follow-up. She was last seen almost 3 years ago and was diagnosed with Alzheimer's type dementia.   She was

## 2018-08-21 NOTE — MR AVS SNAPSHOT
[facility] [address] 
[phone] Patient: Ny Beasley MRN: [id number] NOV:[birth year] Visit Information Date & Time Provider Department Dept. Phone Encounter #  
 8/21/2018 11:40 AM Monae Pleitez MD [facility] at [address] [place] [address] [id number] Follow-up Instructions Return in about 3 months (around 11/21/2018). Upcoming Health Maintenance Date Due DTaP/Tdap/Td series (1 - Tdap) 7/29/1958 ZOSTER VACCINE AGE 60> 5/29/1997 GLAUCOMA SCREENING Q2Y 7/29/2002 MEDICARE YEARLY EXAM 7/7/2018 Influenza Age 5 to Adult 8/1/2018 Allergies as of 8/21/2018  Review Complete On: 8/21/2018 By: Monae Pleitez MD  
 No Known Allergies Current Immunizations  Reviewed on 9/29/2017 Name Date Influenza Vaccine Split 1/4/2011 Pneumococcal Polysaccharide (PPSV-23) 2/20/2014 Pneumococcal Vaccine (Pcv) 11/14/2002 Not reviewed this visit You Were Diagnosed With   
  
 Codes Comments Mixed Alzheimer's and vascular dementia    -  Primary ICD-10-CM: G30.9, F01.50, F02.80 ICD-9-CM: 331.0, 294.10, 290.40 Vitals BP Pulse Resp Height(growth percentile) Weight(growth percentile) SpO2  
 140/90 82 14 5' 2\" (1.575 m) 99 lb (44.9 kg) 98% BMI OB Status Smoking Status 18.11 kg/m2 Postmenopausal Never Smoker Vitals History BMI and BSA Data Body Mass Index Body Surface Area  
 18.11 kg/m 2 1.4 m 2 Preferred Pharmacy Pharmacy Name Phone [address], [address] Your Updated Medication List  
  
   
This list is accurate as of 8/21/18 12:11 PM.  Always use your most recent med list.  
  
  
  
  
 ASPIR-81 81 mg tablet Generic drug:  aspirin delayed-release Take 81 mg by mouth daily. atorvastatin 20 mg tablet Commonly known as:  LIPITOR Take 1 Tab by mouth nightly. donepezil 5 mg tablet Commonly known as:  ARICEPT Take 1 Tab by mouth nightly. irbesartan-hydroCHLOROthiazide 150-12.5 mg per tablet Commonly known as:  AVALIDE  
TAKE 1 TABLET BY MOUTH EVERY DAY  
  
 levothyroxine 75 mcg tablet Commonly known as:  SYNTHROID Take 1 Tab by mouth Daily (before breakfast). NEEDS APPT  
  
 metoprolol tartrate 25 mg tablet Commonly known as:  LOPRESSOR  
TAKE ONE TABLET BY MOUTH EVERY DAY IN THE MORNING AND TAKE TWO TABLETS BY MOUTH EVERY DAY IN THE EVENING Prescriptions Sent to Pharmacy Refills  
 donepezil (ARICEPT) 5 mg tablet 0 Sig: Take 1 Tab by mouth nightly. Class: Normal  
 Pharmacy: [facility], [address]  #: [phone] Route: Oral  
  
Follow-up Instructions Return in about 3 months (around 11/21/2018). Patient Instructions A Healthy Lifestyle: Care Instructions Your Care Instructions A healthy lifestyle can help you feel good, stay at a healthy weight, and have plenty of energy for both work and play. A healthy lifestyle is something you can share with your whole family. A healthy lifestyle also can lower your risk for serious health problems, such as high blood pressure, heart disease, and diabetes. You can follow a few steps listed below to improve your health and the health of your family. Follow-up care is a key part of your treatment and safety. Be sure to make and go to all appointments, and call your doctor if you are having problems. It's also a good idea to know your test results and keep a list of the medicines you take. How can you care for yourself at home? · Do not eat too much sugar, fat, or fast foods. You can still have dessert and treats now and then. The goal is moderation. · Start small to improve your eating habits.  Pay attention to portion sizes, drink less juice and soda pop, and eat more fruits and vegetables. ¨ Eat a healthy amount of food. A 3-ounce serving of meat, for example, is about the size of a deck of cards. Fill the rest of your plate with vegetables and whole grains. ¨ Limit the amount of soda and sports drinks you have every day. Drink more water when you are thirsty. ¨ Eat at least 5 servings of fruits and vegetables every day. It may seem like a lot, but it is not hard to reach this goal. A serving or helping is 1 piece of fruit, 1 cup of vegetables, or 2 cups of leafy, raw vegetables. Have an apple or some carrot sticks as an afternoon snack instead of a candy bar. Try to have fruits and/or vegetables at every meal. 
· Make exercise part of your daily routine. You may want to start with simple activities, such as walking, bicycling, or slow swimming. Try to be active 30 to 60 minutes every day. You do not need to do all 30 to 60 minutes all at once. For example, you can exercise 3 times a day for 10 or 20 minutes. Moderate exercise is safe for most people, but it is always a good idea to talk to your doctor before starting an exercise program. 
· Keep moving. Mendel Stephanie the lawn, work in the garden, or [place]. Take the stairs instead of the elevator at work. · If you smoke, quit. People who smoke have an increased risk for heart attack, stroke, cancer, and other lung illnesses. Quitting is hard, but there are ways to boost your chance of quitting tobacco for good. ¨ Use nicotine gum, patches, or lozenges. ¨ Ask your doctor about stop-smoking programs and medicines. ¨ Keep trying. In addition to reducing your risk of diseases in the future, you will notice some benefits soon after you stop using tobacco. If you have shortness of breath or asthma symptoms, they will likely get better within a few weeks after you quit. · Limit how much alcohol you drink.  Moderate amounts of alcohol (up to 2 drinks a day for men, 1 drink a day for women) are okay. But drinking too much can lead to liver problems, high blood pressure, and other health problems. Family health If you have a family, there are many things you can do together to improve your health. · Eat meals together as a family as often as possible. · Eat healthy foods. This includes fruits, vegetables, lean meats and dairy, and whole grains. · Include your family in your fitness plan. Most people think of activities such as jogging or tennis as the way to fitness, but there are many ways you and your family can be more active. Anything that makes you breathe hard and gets your heart pumping is exercise. Here are some tips: 
¨ Walk to do errands or to take your child to school or the bus. ¨ Go for a family bike ride after dinner instead of watching TV. Where can you learn more? Go to http://rocael-chyna.info/. Enter V278 in the search box to learn more about \"A Healthy Lifestyle: Care Instructions. \" Current as of: December 7, 2017 Content Version: 11.7 © 7499-9690 [employer]. Care instructions adapted under license by [employer] (which disclaims liability or warranty for this information). If you have questions about a medical condition or this instruction, always ask your healthcare professional. [address] any warranty or liability for your use of this information. Introducing [facility] & HEALTH SERVICES! Leroy Lira introduces Loku patient portal. Now you can access parts of your medical record, email your doctor's office, and request medication refills online. 1. In your internet browser, go to [email or web address] 2. Click on the First Time User? Click Here link in the Sign In box. You will see the New Member Sign Up page. 3. Enter your Loku Access Code exactly as it appears below.  You will not need to use this code after youve completed the sign-up process. If you do not sign up before the expiration date, you must request a new code. · Jiff Access Code: [email or web address] Expires: 11/19/2018 11:30 AM 
 
4. Enter the last four digits of your Social Security Number (xxxx) and Date of Birth (mm/dd/yyyy) as indicated and click Submit. You will be taken to the next sign-up page. 5. Create a Jiff ID. This will be your Jiff login ID and cannot be changed, so think of one that is secure and easy to remember. 6. Create a Jiff password. You can change your password at any time. 7. Enter your Password Reset Question and Answer. This can be used at a later time if you forget your password. 8. Enter your e-mail address. You will receive e-mail notification when new information is available in [address] 9. Click Sign Up. You can now view and download portions of your medical record. 10. Click the Download Summary menu link to download a portable copy of your medical information. If you have questions, please visit the Frequently Asked Questions section of the Jiff website. Remember, Jiff is NOT to be used for urgent needs. For medical emergencies, dial 911. Now available from your iPhone and Android! Please provide this summary of care documentation to your next provider. Your primary care clinician is listed as Gilbert Dieudonne. If you have any questions after today's visit, please call [phone].

## 2018-08-21 NOTE — LETTER
8/21/2018 12:46 PM 
 
Patient: Ny Beasley YOB: 1935 Date of Visit: 8/21/2018 Dear John Paul Lennon MD 
[address] [facility] [address] [address] VIA In Basket 
 : 
 
 
Ms. Carlos Plasencia came back for follow-up today. Below are the relevant portions of my assessment and plan of care. If you have questions, please do not hesitate to call me. I look forward to following Ms. Beauchamp along with you. Sincerely, Michael Hastings MD

## 2018-08-21 NOTE — PATIENT INSTRUCTIONS

## 2018-08-27 RX ORDER — METOPROLOL TARTRATE 25 MG/1
TABLET, FILM COATED ORAL
Qty: 270 TAB | Refills: 0 | Status: SHIPPED | OUTPATIENT
Start: 2018-08-27 | End: 2018-11-30 | Stop reason: SDUPTHER

## 2018-10-07 DIAGNOSIS — I10 HYPERTENSION, UNCONTROLLED: ICD-10-CM

## 2018-10-07 RX ORDER — IRBESARTAN AND HYDROCHLOROTHIAZIDE 150; 12.5 MG/1; MG/1
TABLET, FILM COATED ORAL
Qty: 90 TAB | Refills: 0 | Status: SHIPPED | OUTPATIENT
Start: 2018-10-07 | End: 2019-01-13 | Stop reason: SDUPTHER

## 2018-10-23 ENCOUNTER — HOSPITAL ENCOUNTER (OUTPATIENT)
Dept: LAB | Age: 83
Discharge: HOME | End: 2018-10-23
Payer: MEDICARE

## 2018-10-23 ENCOUNTER — OFFICE VISIT (OUTPATIENT)
Dept: FAMILY MEDICINE CLINIC | Age: 83
End: 2018-10-23

## 2018-10-23 VITALS
DIASTOLIC BLOOD PRESSURE: 62 MMHG | HEART RATE: 74 BPM | OXYGEN SATURATION: 97 % | BODY MASS INDEX: 17.96 KG/M2 | SYSTOLIC BLOOD PRESSURE: 104 MMHG | HEIGHT: 62 IN | WEIGHT: 97.6 LBS | TEMPERATURE: 97.7 F | RESPIRATION RATE: 16 BRPM

## 2018-10-23 DIAGNOSIS — G30.1 LATE ONSET ALZHEIMER'S DISEASE WITHOUT BEHAVIORAL DISTURBANCE (HCC): ICD-10-CM

## 2018-10-23 DIAGNOSIS — I10 HYPERTENSION, UNCONTROLLED: ICD-10-CM

## 2018-10-23 DIAGNOSIS — Z13.5 GLAUCOMA SCREENING: ICD-10-CM

## 2018-10-23 DIAGNOSIS — E03.9 ACQUIRED HYPOTHYROIDISM: ICD-10-CM

## 2018-10-23 DIAGNOSIS — D63.8 ANEMIA, CHRONIC DISEASE: ICD-10-CM

## 2018-10-23 DIAGNOSIS — F02.80 LATE ONSET ALZHEIMER'S DISEASE WITHOUT BEHAVIORAL DISTURBANCE (HCC): ICD-10-CM

## 2018-10-23 DIAGNOSIS — N18.30 CHRONIC RENAL INSUFFICIENCY, STAGE III (MODERATE) (HCC): ICD-10-CM

## 2018-10-23 DIAGNOSIS — M81.0 AGE-RELATED OSTEOPOROSIS WITHOUT CURRENT PATHOLOGICAL FRACTURE: ICD-10-CM

## 2018-10-23 DIAGNOSIS — R73.02 GLUCOSE INTOLERANCE (IMPAIRED GLUCOSE TOLERANCE): ICD-10-CM

## 2018-10-23 DIAGNOSIS — Z23 ENCOUNTER FOR IMMUNIZATION: ICD-10-CM

## 2018-10-23 DIAGNOSIS — Z00.00 MEDICARE ANNUAL WELLNESS VISIT, SUBSEQUENT: Primary | ICD-10-CM

## 2018-10-23 PROCEDURE — 36415 COLL VENOUS BLD VENIPUNCTURE: CPT

## 2018-10-23 PROCEDURE — 80061 LIPID PANEL: CPT

## 2018-10-23 PROCEDURE — 85025 COMPLETE CBC W/AUTO DIFF WBC: CPT

## 2018-10-23 PROCEDURE — 80053 COMPREHEN METABOLIC PANEL: CPT

## 2018-10-23 PROCEDURE — 83036 HEMOGLOBIN GLYCOSYLATED A1C: CPT

## 2018-10-23 PROCEDURE — 84443 ASSAY THYROID STIM HORMONE: CPT

## 2018-10-23 NOTE — PROGRESS NOTES
Chief Complaint Patient presents with Shoaib Annual Wellness Visit Reviewed Record in preparation for visit and have obtained necessary documentation. Identified pt with two pt identifiers (Name @ ) Health Maintenance Due Topic  DTaP/Tdap/Td series (1 - Tdap)  Shingrix Vaccine Age 50> (1 of 2)  GLAUCOMA SCREENING Q2Y  MEDICARE YEARLY EXAM   
 Influenza Age 5 to Adult 1. Have you been to the ER, urgent care clinic since your last visit? Hospitalized since your last visit? No 
 
2. Have you seen or consulted any other health care providers outside of the [address] [place] [address] since your last visit? Include any pap smears or colon screening.  No

## 2018-10-23 NOTE — ACP (ADVANCE CARE PLANNING)
Discussed with Sabrina Treviño. Quynh her ability to prepare and advance directive in the case that an injury or illness causes her to be unable to make health care decisions. Doesn't have one, gave honoring choices folder and information about nurse navigator so she could do that if she wanted, with daughter's help.

## 2018-10-23 NOTE — LETTER
10/24/2018 7:52 AM 
 
Ms. Ny Beasley 
[address] Dear Ny Beasley: 
 
Please find your most recent results below. Resulted Orders CBC WITH AUTOMATED DIFF Result Value Ref Range WBC 3.0 (L) 3.4 - 10.8 x10E3/uL  
 RBC 4.15 3.77 - 5.28 x10E6/uL HGB 9.4 (L) 11.1 - 15.9 g/dL HCT 31.1 (L) 34.0 - 46.6 % MCV 75 (L) 79 - 97 fL  
 MCH 22.7 (L) 26.6 - 33.0 pg  
 MCHC 30.2 (L) 31.5 - 35.7 g/dL  
 RDW 15.7 (H) 12.3 - 15.4 % PLATELET 131 137 - 802 x10E3/uL NEUTROPHILS 56 Not Estab. % Lymphocytes 31 Not Estab. % MONOCYTES 10 Not Estab. % EOSINOPHILS 2 Not Estab. % BASOPHILS 1 Not Estab. %  
 ABS. NEUTROPHILS 1.7 1.4 - 7.0 x10E3/uL Abs Lymphocytes 0.9 0.7 - 3.1 x10E3/uL  
 ABS. MONOCYTES 0.3 0.1 - 0.9 x10E3/uL  
 ABS. EOSINOPHILS 0.1 0.0 - 0.4 x10E3/uL  
 ABS. BASOPHILS 0.0 0.0 - 0.2 x10E3/uL IMMATURE GRANULOCYTES 0 Not Estab. %  
 ABS. IMM. GRANS. 0.0 0.0 - 0.1 x10E3/uL METABOLIC PANEL, COMPREHENSIVE Result Value Ref Range Glucose 93 65 - 99 mg/dL BUN 21 8 - 27 mg/dL Creatinine 1.26 (H) 0.57 - 1.00 mg/dL GFR est non-AA 40 (L) >59 mL/min/1.73 GFR est AA 46 (L) >59 mL/min/1.73  
 BUN/Creatinine ratio 17 12 - 28 Sodium 142 134 - 144 mmol/L Potassium 3.8 3.5 - 5.2 mmol/L Chloride 101 96 - 106 mmol/L  
 CO2 25 20 - 29 mmol/L Calcium 8.9 8.7 - 10.3 mg/dL Protein, total 7.0 6.0 - 8.5 g/dL Albumin 4.1 3.5 - 4.7 g/dL GLOBULIN, TOTAL 2.9 1.5 - 4.5 g/dL A-G Ratio 1.4 1.2 - 2.2 Bilirubin, total 0.3 0.0 - 1.2 mg/dL Alk. phosphatase 73 39 - 117 IU/L  
 AST (SGOT) 31 0 - 40 IU/L  
 ALT (SGPT) 16 0 - 32 IU/L  
LIPID PANEL Result Value Ref Range Cholesterol, total 191 100 - 199 mg/dL Triglyceride 105 0 - 149 mg/dL HDL Cholesterol 60 >39 mg/dL VLDL, calculated 21 5 - 40 mg/dL LDL, calculated 110 (H) 0 - 99 mg/dL HEMOGLOBIN A1C WITH EAG Result Value Ref Range Hemoglobin A1c 5.6 4.8 - 5.6 % Comment:  
            Prediabetes: 5.7 - 6.4 Diabetes: >6.4 Glycemic control for adults with diabetes: <7.0 Estimated average glucose 114 mg/dL TSH 3RD GENERATION Result Value Ref Range TSH 12.010 (H) 0.450 - 4.500 uIU/mL RECOMMENDATIONS: 
Most labs were good, including blood counts, kidney, liver, sugar, electrolytes, and cholesterol. Your thyroid is off--we need to increase your dose. We should recheck in 3 months, if possible (try to come see me in January). Please call me if you have any questions: [phone] Sincerely, Jovita Corado MD

## 2018-10-23 NOTE — PROGRESS NOTES
[address] [place], [address] [place] [address] Road 
[phone] Date of visit: 10/23/2018 This is an Subsequent Charmaine Visit (AWV) I have reviewed the patient's medical history in detail and updated the computerized patient record. Anisha Gordon is a 82 y.o. female History obtained from: patient, daughter. Concerns today  
-feeling well but needs refill bp meds and thyroid checked Stable thyroid dose for years She has no concerns They check her BP once weekly, remembers her med 
-has lost 3 pounds. Does eat regularly 
-only 2 meals per day, and they are small, just not a lot of appetite Weight loss very gradual over several years. Denies any abd pain 
Daughter says pt's memory slowly worsening but not a big problem at this point, no behavioral problems/sundowning, still very self-sufficient 
-didn't like the fosamax, made her bones hurt 
-saw neuro recently, back on aricept for dementia, tolerating it History Patient Active Problem List  
Diagnosis Code  Hypothyroidism E03.9  Anemia, chronic disease D63.8  Chronic renal insufficiency, stage III (moderate) (HCC) N18.3  Hypertension, uncontrolled I10  
 Kyphosis (acquired) (postural) M40.00  Glucose intolerance (impaired glucose tolerance) R73.02  
 Alzheimer's dementia G30.9, F02.80  Carotid stenosis, bilateral I65.23  
 Age-related osteoporosis without current pathological fracture M81.0 Past Medical History:  
Diagnosis Date  Anemia of chronic disease 11/03 [place]  Carotid artery disease ([address] repeat dopplers 3/14  Dehydration  Goiter   
 s/p thyroidectomy, benign  Lumbar disc disease   
 ruptured disc L4-L5 s/p epidural  [place]/os  Osteoporosis, unspecified 12/03  Pure hypercholesterolemia 11/03  Unspecified essential hypertension Past Surgical History:  
Procedure Laterality Date  HX COLONOSCOPY  PUCT/ASPIR BREAST CYST,EACH ADDN  2/04 right breast  
 THYROIDECTOMY  5/10  
 benign goiter  Brown/surg No Known Allergies Current Outpatient Medications Medication Sig Dispense Refill  irbesartan-hydroCHLOROthiazide (AVALIDE) 150-12.5 mg per tablet TAKE 1 TABLET BY MOUTH EVERY DAY 90 Tab 0  
 atorvastatin (LIPITOR) 20 mg tablet Take 1 Tab by mouth nightly. Needs appointment 30 Tab 0  
 metoprolol tartrate (LOPRESSOR) 25 mg tablet TAKE 1 TABLET BY MOUTH EVERY MORNING AND 2 TABLETS BY MOUTH EVERY EVENING 270 Tab 0  
 donepezil (ARICEPT) 5 mg tablet Take 1 Tab by mouth nightly. 30 Tab 0  
 levothyroxine (SYNTHROID) 75 mcg tablet Take 1 Tab by mouth Daily (before breakfast). NEEDS APPT 30 Tab 0  
 aspirin delayed-release (ASPIR-81) 81 mg tablet Take 81 mg by mouth daily. Family History Problem Relation Age of Onset  Hypertension Mother  Hypertension Father Social History Tobacco Use  Smoking status: Never Smoker  Smokeless tobacco: Never Used Substance Use Topics  Alcohol use: No  
 
 
Specialists/Care Team  
[facility] the following healthcare providers: 
Patient Care Team: 
Jose Enrique Wise MD as PCP - [facility], [place]) Brittany Quan MD (Cardiology) Radha Barnes MD (Neurology) Nick Dixon MD (Vascular Surgery) Does not have eye doc but willing to be referred, has been a few years Health Risk Assessment Demographics  
female 82 y.o. General Health Questions  
-During the past 4 weeks: 
 -how would you rate your health in general? Good 
 -how often have you been bothered by feeling dizzy when standing up? never -how much have you been bothered by bodily pain? not 
 -Have you noticed any hearing difficulties?  No (daughter says a little problem) 
 -has your physical and emotional health limited your social activities with family or friends? Not really, stays home, been a  for a long time Emotional Health Questions  
-Do you have a history of depression, anxiety, or emotional problems? no 
-Over the past 2 weeks, have you felt down, depressed or hopeless? no 
-Over the past 2 weeks, have you felt little interest or pleasure in doing things? no 
 
Health Habits Please describe your diet habits: does like her Milky Way bars, cereal, meats, veggies, just 2 meals per day Do you get 5 servings of fruits or vegetables daily? no 
Do you exercise regularly? Not much, mostly in the house, occasionally walks to [facility] Activities of Daily Living and Functional Status  
-Do you need help with eating, walking, dressing, bathing, toileting, the phone, transportation, shopping, preparing meals, housework, laundry, medications or managing money? yes 
-In the past four weeks, was someone available to help you if you needed and wanted help with anything? yes 
-Are you confident are you that you can control and manage most of your health problems? yes 
-Have you been given information to help you keep track of your medications? Yes, daughter keeps track but patient remembers 
-How often do you have trouble taking your medications as prescribed? never Fall Risk and Home Safety Have you fallen 2 or more times in the past year? no 
Does your home have rugs in the hallway, lack grab bars in the bathroom, lack handrails on the stairs or have poor lighting? no 
Do you have smoke detectors and check them regularly? yes Do you have difficulties driving a car? Doesn't drive Do you always fasten your seat belt when you are in a car? yes Review of Systems (if indicated for problems addressed today) Card: denies chest pain or palpitations Pulm: denies shortness of breath Physical Examination Vitals:  
 10/23/18 0319 BP: 104/62 Pulse: 74 Resp: 16 Temp: 97.7 °F (36.5 °C) TempSrc: Oral  
SpO2: 97% Weight: 97 lb 9.6 oz (44.3 kg) Height: 5' 2\" (1.575 m) Body mass index is 17.85 kg/m². No exam data present Was the patient's timed Up & Go test unsteady or longer than 30 seconds? no 
 
Evaluation of Cognitive Function Mood/affect:  happy Orientation: knows person, not able to say month or year Appearance: age appropriate Family member/caregiver input: memory slowly getting worse, per daughter Additional exam if indicated for problems addressed today: 
General: stated age, well developed, well nourished and in NAD Eyes: both pupils a bit cloudy appears to be cataracts (both not bothering her she says) Neck: supple, symmetrical, trachea midline, no adenopathy and thyroid: not enlarged, symmetric, no tenderness/mass/nodules Lungs:  clear to auscultation w/o rales, rhonchi, wheezes w/normal effort and no use of accessory muscles of respiration Heart: regular rate and rhythm, S1, S2 normal, no murmur, click, rub or gallop Abdomen: soft, nontender, no masses Ext:  No edema noted. Lymph: no cervical adenopathy appreciated Skin:  Normal. and no rash or abnormalities Psych: alert and oriented to person, place, time and situation and Speech: appropriate quality, quantity and organization of sentences Advice/Referrals/Counseling (as indicated) Education and counseling provided for any problems identified above: more fats in diet, encouraged snacking on nuts, peanut butter, perhaps adding ensure or boost or milkshakes and a multivitamin. Discussed doing exercises at home to help balance and strength, perhaps a chair exercise video Preventive Services (Preventive care checklist to be included in patient instructions) Discussed today Done Previously Not Needed X 13 rx given   Pneumococcal vaccines  
 x  Flu vaccine  
  x Hepatitis B vaccine (if at risk) X rx given   Shingles vaccine X rx given   TDAP vaccine X decines  Mammogram  
  x Pap smear x x Colorectal cancer screening  
  x Low-dose CT for lung cancer screening X last year  Bone density test  
X refer   Glaucoma screening  
x   Cholesterol test  
x   Diabetes screening test   
  x Diabetes self-management class  
  x Nutritionist referral for diabetes or renal disease Discussion of Advance Directive Discussed with Elio Treviño. [place] her ability to prepare and advance directive in the case that an injury or illness causes her to be unable to make health care decisions. Doesn't have one, gave honoring choices folder and information about nurse navigator so she could do that if she wanted, with daughter's help. Assessment/Plan  
Z00.00 ICD-10-CM ICD-9-CM 1. Medicare annual wellness visit, subsequent Z00.00 V70.0 2. Acquired hypothyroidism E03.9 244.9 TSH 3RD GENERATION 3. Chronic renal insufficiency, stage III (moderate) (HCC) N18.3 585.3 4. Anemia, chronic disease D63.8 285.29 CBC WITH AUTOMATED DIFF 5. Hypertension, uncontrolled S62 958.3 METABOLIC PANEL, COMPREHENSIVE  
   LIPID PANEL 6. Age-related osteoporosis without current pathological fracture M81.0 733.01   
7. Late onset Alzheimer's disease without behavioral disturbance G30.1 331.0 F02.80 294.10   
8. Encounter for immunization Z23 V03.89 INFLUENZA VACCINE INACTIVATED (IIV), SUBUNIT, ADJUVANTED, IM  
   ADMIN INFLUENZA VIRUS VAC 9. Glucose intolerance (impaired glucose tolerance) R73.02 790.22 HEMOGLOBIN A1C WITH EAG Orders Placed This Encounter  Influenza Vaccine Inactivated (IIV)(FLUAD), Subunit, Adjuvanted, IM, (91594)  CBC WITH AUTOMATED DIFF  
 METABOLIC PANEL, COMPREHENSIVE  LIPID PANEL  
 HEMOGLOBIN A1C WITH EAG  
 TSH 3RD GENERATION  
 Administration fee () for Medicare insured patients Preventive care as above Encouraged the vaccines if possible cost-wise Flu shot today Routine labs today for chronic problems Still underweight, discussed again need for more protein/fat in diet Perhaps changing milky way bars to chocolate covered peanuts and trying to cut out the Sprite would be helpful. Encouraged snacks, consider ensure/boost 
Has dementia but thankfully is cheerful and not having behavioral problems. Doing ok back on aricept 
-going to see how her kidney function is; if ok will try fosamax again, if not will consider prolia Follow-up Disposition: 
Return in about 6 months (around 4/23/2019).  
 
Lucas Pace MD

## 2018-10-23 NOTE — PATIENT INSTRUCTIONS
Well Visit, Over 72: Care Instructions Your Care Instructions Physical exams can help you stay healthy. Your doctor has checked your overall health and may have suggested ways to take good care of yourself. He or she also may have recommended tests. At home, you can help prevent illness with healthy eating, regular exercise, and other steps. Follow-up care is a key part of your treatment and safety. Be sure to make and go to all appointments, and call your doctor if you are having problems. It's also a good idea to know your test results and keep a list of the medicines you take. How can you care for yourself at home? · Reach and stay at a healthy weight. This will lower your risk for many problems, such as obesity, diabetes, heart disease, and high blood pressure. · Get at least 30 minutes of exercise on most days of the week. Walking is a good choice. You also may want to do other activities, such as running, swimming, cycling, or playing tennis or team sports. · Do not smoke. Smoking can make health problems worse. If you need help quitting, talk to your doctor about stop-smoking programs and medicines. These can increase your chances of quitting for good. · Protect your skin from too much sun. When you're outdoors from 10 a.m. to 4 p.m., stay in the shade or cover up with clothing and a hat with a wide brim. Wear sunglasses that block UV rays. Even when it's cloudy, put broad-spectrum sunscreen (SPF 30 or higher) on any exposed skin. · See a dentist one or two times a year for checkups and to have your teeth cleaned. · Wear a seat belt in the car. · Limit alcohol to 2 drinks a day for men and 1 drink a day for women. Too much alcohol can cause health problems. Follow your doctor's advice about when to have certain tests. These tests can spot problems early. For men and women · Cholesterol.  Your doctor will tell you how often to have this done based on your overall health and other things that can increase your risk for heart attack and stroke. · Blood pressure. Have your blood pressure checked during a routine doctor visit. Your doctor will tell you how often to check your blood pressure based on your age, your blood pressure results, and other factors. · Diabetes. Ask your doctor whether you should have tests for diabetes. · Vision. Experts recommend that you have yearly exams for glaucoma and other age-related eye problems. · Hearing. Tell your doctor if you notice any change in your hearing. You can have tests to find out how well you hear. · Colon cancer tests. Keep having colon cancer tests as your doctor recommends. You can have one of several types of tests. · Heart attack and stroke risk. At least every 4 to 6 years, you should have your risk for heart attack and stroke assessed. Your doctor uses factors such as your age, blood pressure, cholesterol, and whether you smoke or have diabetes to show what your risk for a heart attack or stroke is over the next 10 years. · Osteoporosis. Talk to your doctor about whether you should have a bone density test to find out whether you have thinning bones. Also ask your doctor about whether you should take calcium and vitamin D supplements. For women · Pap test and pelvic exam. You may no longer need a Pap test. Talk with your doctor about whether to stop or continue to have Pap tests. · Breast exam and mammogram. Ask how often you should have a mammogram, which is an X-ray of your breasts. A mammogram can spot breast cancer before it can be felt and when it is easiest to treat. · Thyroid disease. Talk to your doctor about whether to have your thyroid checked as part of a regular physical exam. Women have an increased chance of a thyroid problem. For men · Prostate exam. Talk to your doctor about whether you should have a blood test (called a PSA test) for prostate cancer.  Experts disagree on whether men should have this test. Some experts recommend that you discuss the benefits and risks of the test with your doctor. · Abdominal aortic aneurysm. Ask your doctor whether you should have a test to check for an aneurysm. You may need a test if you ever smoked or if your parent, brother, sister, or child has had an aneurysm. When should you call for help? Watch closely for changes in your health, and be sure to contact your doctor if you have any problems or symptoms that concern you. Where can you learn more? Go to http://rocael-chyna.info/. Enter D306 in the search box to learn more about \"Well Visit, Over 65: Care Instructions. \" Current as of: March 29, 2018 Content Version: 11.8 © 7475-5860 [employer]. Care instructions adapted under license by [employer] (which disclaims liability or warranty for this information). If you have questions about a medical condition or this instruction, always ask your healthcare professional. [place] [address] any warranty or liability for your use of this information. Helping a Person With Alzheimer's Disease: Care Instructions Your Care Instructions Alzheimer's disease is a type of dementia. It affects memory, intelligence, judgment, language, and behavior. It is not clear what causes this disease. But it is the most common form of dementia in older adults. It may take many years to develop. Alzheimer's disease is different than mild memory loss that occurs with aging. Family members usually notice symptoms first. But the person also may realize that something is wrong. Follow-up care is a key part of your loved one's treatment and safety. Be sure to make and go to all appointments, and call your doctor if your loved one is having problems. It's also a good idea to know your loved one's test results and keep a list of the medicines he or she takes. How can you care for your loved one at home? · Develop a routine. The person will feel less frustrated or confused with a clear, simple daily plan. Remind him or her about important facts and events. · Be patient. It may take longer for the person to complete a task than it used to. · Help the person eat a balanced diet. Serve plenty of whole grains, fruits, and vegetables every day. If the person is not eating well at mealtimes, give snacks at midmorning and in the afternoon. Offer drinks such as Boost, Ensure, or Sustacal if he or she is losing weight. · Encourage exercise. Walking and other activity may slow the decline of mental ability. Help the person keep an active mind. Encourage hobbies such as reading and crossword puzzles. · Take steps to help if the person is sundowning. This is the restless behavior and trouble with sleeping that may occur in late afternoon and at night. Try not to let the person nap during the day. Offer a glass of warm milk or caffeine-free tea before bedtime. · Ask family members and friends for help. You may need breaks where others can help care for the person. · Talk to the person's doctor about what resources are available for help in your area. · Review all of the person's medicines with his or her doctor. · For as long as the person is able, allow him or her to make decisions about activities, food, clothing, and other choices. Let the person be independent, even if tasks take more time or are not done perfectly. Tailor tasks to the person's abilities. For example, if cooking is no longer safe, ask for other help. He or she can help set the table or make simple dishes such as a salad. When the person needs help, offer it gently. Keeping safe · Make your home (or the person's home) safe. Tack down rugs, and put no-slip tape in the tub. Install handrails, and put safety switches on stoves and appliances. Keep rooms free of clutter.  Make sure walkways around furniture are clear. Do not move furniture around, because the person may become confused. · Use locks on doors and cupboards. Lock up knives, scissors, medicines, cleaning supplies, and other dangerous things. · Do not let the person drive or cook if he or she cannot do it safely. A person with Alzheimer's should not drive unless he or she is able to pass an on-road driving test. Your state 's license bureau can do a driving test if there is any question. · Get medical alert jewelry for the person so you can be contacted if he or she wanders away. If possible, provide a safe place for wandering, such as an enclosed yard or garden. When should you call for help? Call 911 anytime you think you may need emergency care. For example, call if: 
  · A person who has Alzheimer's disease has disappeared.  
  · A person who has Alzheimer's disease is seriously injured.  
[facility] your doctor now or seek immediate medical care if: 
  · The person you are caring for suddenly sees or hears things that are not there (hallucinates).  
  · The person you are caring for has a sudden, drastic change in his or her behavior.  
 Watch closely for changes in your loved one's health, and be sure to contact the doctor if: 
  · A person who has Alzheimer's disease gradually gets worse or has symptoms that could cause injury.  
  · You need help caring for a person with Alzheimer's disease.  
  · The person has problems with his or her medicine. Where can you learn more? Go to http://rocael-chyna.info/. Enter O702 in the search box to learn more about \"Helping a Person With Alzheimer's Disease: Care Instructions. \" Current as of: December 7, 2017 Content Version: 11.8 © 5628-3346 [employer]. Care instructions adapted under license by [employer] (which disclaims liability or warranty for this information).  If you have questions about a medical condition or this instruction, always ask your healthcare professional. [address] any warranty or liability for your use of this information. After I see her labs, we will let you know if ok to use fosamax again or if we should try for Prolia Resources to help caregivers of dementia patients: 
 
Local expert Annelise Leigh has occasional one-day workshops to train caregivers, as well as online webinars: 
 
Http://[email or web address]. [facility]/ 
 
Alzheimers Association caregiver events in [facility] area: 
 
[email or web address]

## 2018-10-24 ENCOUNTER — PATIENT OUTREACH (OUTPATIENT)
Dept: FAMILY MEDICINE CLINIC | Age: 83
End: 2018-10-24

## 2018-10-24 LAB
ALBUMIN SERPL-MCNC: 4.1 G/DL (ref 3.5–4.7)
ALBUMIN/GLOB SERPL: 1.4 {RATIO} (ref 1.2–2.2)
ALP SERPL-CCNC: 73 IU/L (ref 39–117)
ALT SERPL-CCNC: 16 IU/L (ref 0–32)
AST SERPL-CCNC: 31 IU/L (ref 0–40)
BASOPHILS # BLD AUTO: 0 X10E3/UL (ref 0–0.2)
BASOPHILS NFR BLD AUTO: 1 %
BILIRUB SERPL-MCNC: 0.3 MG/DL (ref 0–1.2)
BUN SERPL-MCNC: 21 MG/DL (ref 8–27)
BUN/CREAT SERPL: 17 (ref 12–28)
CALCIUM SERPL-MCNC: 8.9 MG/DL (ref 8.7–10.3)
CHLORIDE SERPL-SCNC: 101 MMOL/L (ref 96–106)
CHOLEST SERPL-MCNC: 191 MG/DL (ref 100–199)
CO2 SERPL-SCNC: 25 MMOL/L (ref 20–29)
CREAT SERPL-MCNC: 1.26 MG/DL (ref 0.57–1)
EOSINOPHIL # BLD AUTO: 0.1 X10E3/UL (ref 0–0.4)
EOSINOPHIL NFR BLD AUTO: 2 %
ERYTHROCYTE [DISTWIDTH] IN BLOOD BY AUTOMATED COUNT: 15.7 % (ref 12.3–15.4)
EST. AVERAGE GLUCOSE BLD GHB EST-MCNC: 114 MG/DL
GLOBULIN SER CALC-MCNC: 2.9 G/DL (ref 1.5–4.5)
GLUCOSE SERPL-MCNC: 93 MG/DL (ref 65–99)
HBA1C MFR BLD: 5.6 % (ref 4.8–5.6)
HCT VFR BLD AUTO: 31.1 % (ref 34–46.6)
HDLC SERPL-MCNC: 60 MG/DL
HGB BLD-MCNC: 9.4 G/DL (ref 11.1–15.9)
IMM GRANULOCYTES # BLD: 0 X10E3/UL (ref 0–0.1)
IMM GRANULOCYTES NFR BLD: 0 %
INTERPRETATION, 910389: NORMAL
INTERPRETATION: NORMAL
LDLC SERPL CALC-MCNC: 110 MG/DL (ref 0–99)
LYMPHOCYTES # BLD AUTO: 0.9 X10E3/UL (ref 0.7–3.1)
LYMPHOCYTES NFR BLD AUTO: 31 %
MCH RBC QN AUTO: 22.7 PG (ref 26.6–33)
MCHC RBC AUTO-ENTMCNC: 30.2 G/DL (ref 31.5–35.7)
MCV RBC AUTO: 75 FL (ref 79–97)
MONOCYTES # BLD AUTO: 0.3 X10E3/UL (ref 0.1–0.9)
MONOCYTES NFR BLD AUTO: 10 %
NEUTROPHILS # BLD AUTO: 1.7 X10E3/UL (ref 1.4–7)
NEUTROPHILS NFR BLD AUTO: 56 %
PDF IMAGE, 910387: NORMAL
PLATELET # BLD AUTO: 223 X10E3/UL (ref 150–379)
POTASSIUM SERPL-SCNC: 3.8 MMOL/L (ref 3.5–5.2)
PROT SERPL-MCNC: 7 G/DL (ref 6–8.5)
RBC # BLD AUTO: 4.15 X10E6/UL (ref 3.77–5.28)
SODIUM SERPL-SCNC: 142 MMOL/L (ref 134–144)
TRIGL SERPL-MCNC: 105 MG/DL (ref 0–149)
TSH SERPL DL<=0.005 MIU/L-ACNC: 12.01 UIU/ML (ref 0.45–4.5)
VLDLC SERPL CALC-MCNC: 21 MG/DL (ref 5–40)
WBC # BLD AUTO: 3 X10E3/UL (ref 3.4–10.8)

## 2018-10-24 RX ORDER — LEVOTHYROXINE SODIUM 88 UG/1
88 TABLET ORAL
Qty: 90 TAB | Refills: 1 | Status: SHIPPED | OUTPATIENT
Start: 2018-10-24 | End: 2019-10-19 | Stop reason: SDUPTHER

## 2018-10-24 NOTE — PROGRESS NOTES
Sent in letter: Most labs were good, including blood counts, kidney, liver, sugar, electrolytes, and cholesterol. Your thyroid is off--we need to increase your dose. We should recheck in 3 months, if possible (try to come see me in January).

## 2018-10-24 NOTE — PROGRESS NOTES
Called patient's daughter -LM requesting she call me back. Would like to offer her mother the opportunity to discuss ACP.  felt like she would be able to make decisions about her health care. Daughter,Shaunna, called back. Offered to schedule a conversation with she and mother re ACP. Scheduled appt for 11/1 at [address] her my contact information in case she needs to reschedule the appt.

## 2019-04-16 ENCOUNTER — TELEPHONE (OUTPATIENT)
Dept: FAMILY MEDICINE CLINIC | Age: 84
End: 2019-04-16

## 2019-04-16 NOTE — TELEPHONE ENCOUNTER
Ariel Bhatia (pts daughter) is calling stating  that she has the shingles, Ariel Bhatia wanted to know has the patient has Shingles vaccine.         Best callback:  [phone]      LOV:  Tuesday, October 23, 2018

## 2019-04-16 NOTE — TELEPHONE ENCOUNTER
Let dtr know that we don't have a record of pt having the shingles vaccine. Dtr said that she had just been dx with shingles and just concerned about her mother.

## 2019-07-27 DIAGNOSIS — I10 HYPERTENSION, UNCONTROLLED: ICD-10-CM

## 2019-07-27 RX ORDER — IRBESARTAN AND HYDROCHLOROTHIAZIDE 150; 12.5 MG/1; MG/1
TABLET, FILM COATED ORAL
Qty: 90 TAB | Refills: 0 | Status: SHIPPED | OUTPATIENT
Start: 2019-07-27 | End: 2019-10-15 | Stop reason: SDUPTHER

## 2019-09-17 ENCOUNTER — TELEPHONE (OUTPATIENT)
Dept: FAMILY MEDICINE CLINIC | Age: 84
End: 2019-09-17

## 2019-09-17 NOTE — TELEPHONE ENCOUNTER
----- Message from John Theodore MD sent at 2019  7:54 AM EDT -----  Regarding: reschedule  Hi, would someone help her reschedule for October? I can't do her medicare wellness until Oct 1st. Thanks!! John Theodore MD 2019 7:54 AM     Outbound call to Pt.  verified. Pt. Is rescheduled for October 15 @ 10:15am for her medicare wellness.

## 2019-09-27 ENCOUNTER — HOSPITAL ENCOUNTER (INPATIENT)
Age: 84
LOS: 1 days | Discharge: HOME | DRG: 069 | End: 2019-09-29
Attending: EMERGENCY MEDICINE | Admitting: FAMILY MEDICINE
Payer: MEDICARE

## 2019-09-27 ENCOUNTER — APPOINTMENT (OUTPATIENT)
Dept: GENERAL RADIOLOGY | Age: 84
DRG: 069 | End: 2019-09-27
Attending: EMERGENCY MEDICINE
Payer: MEDICARE

## 2019-09-27 ENCOUNTER — APPOINTMENT (OUTPATIENT)
Dept: CT IMAGING | Age: 84
DRG: 069 | End: 2019-09-27
Attending: EMERGENCY MEDICINE
Payer: MEDICARE
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
## 2019-09-27 DIAGNOSIS — R47.1 DYSARTHRIA: ICD-10-CM

## 2019-09-27 DIAGNOSIS — G30.9 MIXED ALZHEIMER'S AND VASCULAR DEMENTIA (HCC): ICD-10-CM

## 2019-09-27 DIAGNOSIS — R55 SYNCOPE AND COLLAPSE: Primary | ICD-10-CM

## 2019-09-27 DIAGNOSIS — F03.90 DEMENTIA WITHOUT BEHAVIORAL DISTURBANCE, UNSPECIFIED DEMENTIA TYPE: ICD-10-CM

## 2019-09-27 DIAGNOSIS — F01.50 MIXED ALZHEIMER'S AND VASCULAR DEMENTIA (HCC): ICD-10-CM

## 2019-09-27 DIAGNOSIS — F02.80 MIXED ALZHEIMER'S AND VASCULAR DEMENTIA (HCC): ICD-10-CM

## 2019-09-27 LAB
ALBUMIN SERPL-MCNC: 3 G/DL (ref 3.5–5)
ALBUMIN/GLOB SERPL: 0.7 {RATIO} (ref 1.1–2.2)
ALP SERPL-CCNC: 92 U/L (ref 45–117)
ALT SERPL-CCNC: 13 U/L (ref 12–78)
ANION GAP SERPL CALC-SCNC: 10 MMOL/L (ref 5–15)
APPEARANCE UR: CLEAR
AST SERPL-CCNC: 19 U/L (ref 15–37)
BACTERIA URNS QL MICRO: NEGATIVE /HPF
BASOPHILS # BLD: 0 K/UL (ref 0–0.1)
BASOPHILS NFR BLD: 1 % (ref 0–1)
BILIRUB SERPL-MCNC: 0.2 MG/DL (ref 0.2–1)
BILIRUB UR QL: NEGATIVE
BNP SERPL-MCNC: 571 PG/ML (ref 0–450)
BUN SERPL-MCNC: 19 MG/DL (ref 6–20)
BUN/CREAT SERPL: 14 (ref 12–20)
CALCIUM SERPL-MCNC: 8.9 MG/DL (ref 8.5–10.1)
CHLORIDE SERPL-SCNC: 97 MMOL/L (ref 97–108)
CK MB CFR SERPL CALC: NORMAL % (ref 0–2.5)
CK MB SERPL-MCNC: <1 NG/ML (ref 5–25)
CK SERPL-CCNC: 91 U/L (ref 26–192)
CO2 SERPL-SCNC: 28 MMOL/L (ref 21–32)
COLOR UR: ABNORMAL
COMMENT, HOLDF: NORMAL
CREAT SERPL-MCNC: 1.36 MG/DL (ref 0.55–1.02)
DIFFERENTIAL METHOD BLD: ABNORMAL
EOSINOPHIL # BLD: 0.1 K/UL (ref 0–0.4)
EOSINOPHIL NFR BLD: 2 % (ref 0–7)
EPITH CASTS URNS QL MICRO: NORMAL /LPF
ERYTHROCYTE [DISTWIDTH] IN BLOOD BY AUTOMATED COUNT: 15.9 % (ref 11.5–14.5)
GLOBULIN SER CALC-MCNC: 4.6 G/DL (ref 2–4)
GLUCOSE BLD STRIP.AUTO-MCNC: 98 MG/DL (ref 65–100)
GLUCOSE SERPL-MCNC: 100 MG/DL (ref 65–100)
GLUCOSE UR STRIP.AUTO-MCNC: NEGATIVE MG/DL
HCT VFR BLD AUTO: 25.7 % (ref 35–47)
HGB BLD-MCNC: 8 G/DL (ref 11.5–16)
HGB UR QL STRIP: NEGATIVE
IMM GRANULOCYTES # BLD AUTO: 0 K/UL (ref 0–0.04)
IMM GRANULOCYTES NFR BLD AUTO: 0 % (ref 0–0.5)
KETONES UR QL STRIP.AUTO: NEGATIVE MG/DL
LEUKOCYTE ESTERASE UR QL STRIP.AUTO: ABNORMAL
LYMPHOCYTES # BLD: 1 K/UL (ref 0.8–3.5)
LYMPHOCYTES NFR BLD: 32 % (ref 12–49)
MCH RBC QN AUTO: 22.5 PG (ref 26–34)
MCHC RBC AUTO-ENTMCNC: 31.1 G/DL (ref 30–36.5)
MCV RBC AUTO: 72.2 FL (ref 80–99)
MONOCYTES # BLD: 0.4 K/UL (ref 0–1)
MONOCYTES NFR BLD: 13 % (ref 5–13)
NEUTS SEG # BLD: 1.7 K/UL (ref 1.8–8)
NEUTS SEG NFR BLD: 52 % (ref 32–75)
NITRITE UR QL STRIP.AUTO: NEGATIVE
NRBC # BLD: 0 K/UL (ref 0–0.01)
NRBC BLD-RTO: 0 PER 100 WBC
PH UR STRIP: 7 [PH] (ref 5–8)
PLATELET # BLD AUTO: 283 K/UL (ref 150–400)
PMV BLD AUTO: 9.8 FL (ref 8.9–12.9)
POTASSIUM SERPL-SCNC: 3.4 MMOL/L (ref 3.5–5.1)
PROT SERPL-MCNC: 7.6 G/DL (ref 6.4–8.2)
PROT UR STRIP-MCNC: NEGATIVE MG/DL
RBC # BLD AUTO: 3.56 M/UL (ref 3.8–5.2)
RBC #/AREA URNS HPF: NORMAL /HPF (ref 0–5)
SAMPLES BEING HELD,HOLD: NORMAL
SERVICE CMNT-IMP: NORMAL
SODIUM SERPL-SCNC: 135 MMOL/L (ref 136–145)
SP GR UR REFRACTOMETRY: 1.01 (ref 1–1.03)
TROPONIN I SERPL-MCNC: <0.05 NG/ML
UROBILINOGEN UR QL STRIP.AUTO: 0.2 EU/DL (ref 0.2–1)
WBC # BLD AUTO: 3.3 K/UL (ref 3.6–11)
WBC URNS QL MICRO: NORMAL /HPF (ref 0–4)

## 2019-09-27 PROCEDURE — 84484 ASSAY OF TROPONIN QUANT: CPT

## 2019-09-27 PROCEDURE — 81001 URINALYSIS AUTO W/SCOPE: CPT

## 2019-09-27 PROCEDURE — 72125 CT NECK SPINE W/O DYE: CPT

## 2019-09-27 PROCEDURE — 70496 CT ANGIOGRAPHY HEAD: CPT

## 2019-09-27 PROCEDURE — 71045 X-RAY EXAM CHEST 1 VIEW: CPT

## 2019-09-27 PROCEDURE — 82962 GLUCOSE BLOOD TEST: CPT

## 2019-09-27 PROCEDURE — 74011636320 HC RX REV CODE- 636/320: Performed by: EMERGENCY MEDICINE

## 2019-09-27 PROCEDURE — 74011250637 HC RX REV CODE- 250/637: Performed by: HOSPITALIST

## 2019-09-27 PROCEDURE — 80053 COMPREHEN METABOLIC PANEL: CPT

## 2019-09-27 PROCEDURE — 65660000000 HC RM CCU STEPDOWN

## 2019-09-27 PROCEDURE — 83880 ASSAY OF NATRIURETIC PEPTIDE: CPT

## 2019-09-27 PROCEDURE — 36415 COLL VENOUS BLD VENIPUNCTURE: CPT

## 2019-09-27 PROCEDURE — 74011250636 HC RX REV CODE- 250/636: Performed by: HOSPITALIST

## 2019-09-27 PROCEDURE — 82550 ASSAY OF CK (CPK): CPT

## 2019-09-27 PROCEDURE — 93005 ELECTROCARDIOGRAM TRACING: CPT

## 2019-09-27 PROCEDURE — 70450 CT HEAD/BRAIN W/O DYE: CPT

## 2019-09-27 PROCEDURE — 74011000258 HC RX REV CODE- 258: Performed by: EMERGENCY MEDICINE

## 2019-09-27 PROCEDURE — 99285 EMERGENCY DEPT VISIT HI MDM: CPT

## 2019-09-27 PROCEDURE — 85025 COMPLETE CBC W/AUTO DIFF WBC: CPT

## 2019-09-27 RX ORDER — HYDROCHLOROTHIAZIDE 25 MG/1
12.5 TABLET ORAL DAILY
Status: DISCONTINUED | OUTPATIENT
Start: 2019-09-28 | End: 2019-09-29 | Stop reason: HOSPADM

## 2019-09-27 RX ORDER — LABETALOL HYDROCHLORIDE 5 MG/ML
5 INJECTION, SOLUTION INTRAVENOUS
Status: DISCONTINUED | OUTPATIENT
Start: 2019-09-27 | End: 2019-09-29 | Stop reason: HOSPADM

## 2019-09-27 RX ORDER — ASPIRIN 325 MG
325 TABLET ORAL DAILY
Status: DISCONTINUED | OUTPATIENT
Start: 2019-09-27 | End: 2019-09-29 | Stop reason: HOSPADM

## 2019-09-27 RX ORDER — SODIUM CHLORIDE 0.9 % (FLUSH) 0.9 %
10 SYRINGE (ML) INJECTION
Status: COMPLETED | OUTPATIENT
Start: 2019-09-27 | End: 2019-09-27

## 2019-09-27 RX ORDER — ACETAMINOPHEN 325 MG/1
650 TABLET ORAL
COMMUNITY
End: 2019-10-10

## 2019-09-27 RX ORDER — ACETAMINOPHEN 650 MG/1
650 SUPPOSITORY RECTAL
Status: DISCONTINUED | OUTPATIENT
Start: 2019-09-27 | End: 2019-09-29 | Stop reason: HOSPADM

## 2019-09-27 RX ORDER — METOPROLOL TARTRATE 25 MG/1
50 TABLET, FILM COATED ORAL
Status: DISCONTINUED | OUTPATIENT
Start: 2019-09-27 | End: 2019-09-29 | Stop reason: HOSPADM

## 2019-09-27 RX ORDER — LEVOTHYROXINE SODIUM 88 UG/1
88 TABLET ORAL
Status: DISCONTINUED | OUTPATIENT
Start: 2019-09-28 | End: 2019-09-29 | Stop reason: HOSPADM

## 2019-09-27 RX ORDER — DOCUSATE SODIUM 100 MG/1
100 CAPSULE, LIQUID FILLED ORAL 2 TIMES DAILY
Status: DISCONTINUED | OUTPATIENT
Start: 2019-09-28 | End: 2019-09-29 | Stop reason: HOSPADM

## 2019-09-27 RX ORDER — LOSARTAN POTASSIUM 50 MG/1
50 TABLET ORAL DAILY
Status: DISCONTINUED | OUTPATIENT
Start: 2019-09-28 | End: 2019-09-29 | Stop reason: HOSPADM

## 2019-09-27 RX ORDER — ENOXAPARIN SODIUM 100 MG/ML
30 INJECTION SUBCUTANEOUS EVERY 24 HOURS
Status: DISCONTINUED | OUTPATIENT
Start: 2019-09-27 | End: 2019-09-29 | Stop reason: HOSPADM

## 2019-09-27 RX ORDER — ATORVASTATIN CALCIUM 20 MG/1
20 TABLET, FILM COATED ORAL
Status: DISCONTINUED | OUTPATIENT
Start: 2019-09-27 | End: 2019-09-29 | Stop reason: HOSPADM

## 2019-09-27 RX ORDER — ENOXAPARIN SODIUM 100 MG/ML
40 INJECTION SUBCUTANEOUS EVERY 24 HOURS
Status: DISCONTINUED | OUTPATIENT
Start: 2019-09-27 | End: 2019-09-27 | Stop reason: DRUGHIGH

## 2019-09-27 RX ORDER — PANTOPRAZOLE SODIUM 40 MG/1
40 TABLET, DELAYED RELEASE ORAL
Status: DISCONTINUED | OUTPATIENT
Start: 2019-09-28 | End: 2019-09-29 | Stop reason: HOSPADM

## 2019-09-27 RX ORDER — ACETAMINOPHEN 325 MG/1
650 TABLET ORAL
Status: DISCONTINUED | OUTPATIENT
Start: 2019-09-27 | End: 2019-09-29 | Stop reason: HOSPADM

## 2019-09-27 RX ORDER — ONDANSETRON 2 MG/ML
4 INJECTION INTRAMUSCULAR; INTRAVENOUS
Status: DISCONTINUED | OUTPATIENT
Start: 2019-09-27 | End: 2019-09-29 | Stop reason: HOSPADM

## 2019-09-27 RX ORDER — METOPROLOL TARTRATE 25 MG/1
25 TABLET, FILM COATED ORAL DAILY
Status: DISCONTINUED | OUTPATIENT
Start: 2019-09-28 | End: 2019-09-29 | Stop reason: HOSPADM

## 2019-09-27 RX ADMIN — ATORVASTATIN CALCIUM 20 MG: 20 TABLET, FILM COATED ORAL at 23:28

## 2019-09-27 RX ADMIN — ASPIRIN 325 MG ORAL TABLET 325 MG: 325 PILL ORAL at 23:28

## 2019-09-27 RX ADMIN — METOPROLOL TARTRATE 50 MG: 25 TABLET ORAL at 23:28

## 2019-09-27 RX ADMIN — SODIUM CHLORIDE 50 ML: 900 INJECTION, SOLUTION INTRAVENOUS at 20:37

## 2019-09-27 RX ADMIN — Medication 10 ML: at 20:36

## 2019-09-27 RX ADMIN — IOPAMIDOL 100 ML: 755 INJECTION, SOLUTION INTRAVENOUS at 20:36

## 2019-09-27 RX ADMIN — ENOXAPARIN SODIUM 30 MG: 30 INJECTION SUBCUTANEOUS at 23:27

## 2019-09-27 NOTE — ED PROVIDER NOTES
82-year-old -American female with past medical history anemia, carotid artery disease, dehydration, dementia, goiter status post thyroidectomy, lumbar disc disease L4-L5 status post epidural jocelin/loss, osteoporosis high cholesterol and unspecified hypertension who patient states she ate some corn flakes this morning for breakfast thinks she hit her head when she fell. She was found in the floor by her nephew and states she was unable to get out of the floor because she felt too weak. She lives with 1 daughter/presents with a second daughter after being found in the floor today for unknown period of time, and this is why we have a last known well time currently.                Past Medical History:   Diagnosis Date    Anemia of chronic disease 11/03    Formanek-hem    Carotid artery disease (HCC)     repeat dopplers 3/14    Dehydration     Dementia     Goiter     s/p thyroidectomy, benign    Lumbar disc disease     ruptured disc L4-L5 s/p epidural  Hernando/os    Osteoporosis, unspecified 12/03    Pure hypercholesterolemia 11/03    Unspecified essential hypertension        Past Surgical History:   Procedure Laterality Date    HX COLONOSCOPY      PUCT/ASPIR BREAST CYST,EACH ADDN  2/04    right breast    THYROIDECTOMY  5/10    benign goiter  Brown/surg         Family History:   Problem Relation Age of Onset    Hypertension Mother     Hypertension Father        Social History     Socioeconomic History    Marital status:      Spouse name: Not on file    Number of children: Not on file    Years of education: Not on file    Highest education level: Not on file   Occupational History    Not on file   Social Needs    Financial resource strain: Not on file    Food insecurity:     Worry: Not on file     Inability: Not on file    Transportation needs:     Medical: Not on file     Non-medical: Not on file   Tobacco Use    Smoking status: Never Smoker    Smokeless tobacco: Never Used Substance and Sexual Activity    Alcohol use: No    Drug use: No    Sexual activity: Not Currently   Lifestyle    Physical activity:     Days per week: Not on file     Minutes per session: Not on file    Stress: Not on file   Relationships    Social connections:     Talks on phone: Not on file     Gets together: Not on file     Attends Mandaen service: Not on file     Active member of club or organization: Not on file     Attends meetings of clubs or organizations: Not on file     Relationship status: Not on file    Intimate partner violence:     Fear of current or ex partner: Not on file     Emotionally abused: Not on file     Physically abused: Not on file     Forced sexual activity: Not on file   Other Topics Concern     Service Not Asked    Blood Transfusions Not Asked    Caffeine Concern Not Asked    Occupational Exposure Not Asked   Mary Lou [place] Hazards Not Asked    Sleep Concern Not Asked    Stress Concern Not Asked    Weight Concern Not Asked    Special Diet Not Asked    Back Care Not Asked    Exercise Yes     Comment: walks    Bike Helmet Not Asked    Seat Belt Not Asked    Self-Exams Not Asked   Social History Narrative    Lives with her daughter's family             ALLERGIES: Patient has no known allergies. Review of Systems   Unable to perform ROS: Dementia       Vitals:    09/27/19 1750   BP: 192/84   Pulse: 84   Resp: 19   Temp: 98.6 °F (37 °C)   SpO2: 100%   Weight: 45 kg (99 lb 3.3 oz)   Height: 5' (1.524 m)            Physical Exam   Constitutional: She is oriented to person, place, and time. She appears well-developed and well-nourished. NAD, AxOx1, speaking in complete sentences/ wears glasses   gcs = 15         HENT:   Head: Normocephalic and atraumatic.    Right Ear: External ear normal.   Left Ear: External ear normal.   Nose: Nose normal.   Cn intact grossly    No facial droop/ slurred speech/ tongue deviation   Eyes: Pupils are equal, round, and reactive to light. Conjunctivae and EOM are normal. Right eye exhibits no discharge. Left eye exhibits no discharge. No scleral icterus. Neck: Normal range of motion. Neck supple. No JVD present. No tracheal deviation present. Cardiovascular: Normal rate, regular rhythm, normal heart sounds and intact distal pulses. Exam reveals no gallop and no friction rub. No murmur heard. Pulmonary/Chest: Effort normal and breath sounds normal. No respiratory distress. She has no wheezes. She has no rales. She exhibits no tenderness. Abdominal: Soft. Bowel sounds are normal. There is no tenderness. There is no rebound and no guarding. nttp     Genitourinary: No vaginal discharge found. Genitourinary Comments: Pt denies urinary/ vaginal complaints   Musculoskeletal: Normal range of motion. She exhibits no edema, tenderness or deformity. Pt had central/ paraspinal C/T/L spines, Upper/Lower ext long bones, Abdomen,  Pelvis, hands /feet and all joints palpated and tolerated well (except as above) ; Pt has motor/ CV / Sensation grossly intact to all extremities; Neurological: She is alert and oriented to person, place, and time. No cranial nerve deficit. She exhibits normal muscle tone. Coordination normal.   pt has motor/ CV/ Sensation grossly intact to all extremities, R = L in strength;   Skin: Skin is warm and dry. Capillary refill takes less than 2 seconds. No rash noted. No erythema. No pallor. Psychiatric: She has a normal mood and affect. Her behavior is normal. Thought content normal.   Nursing note and vitals reviewed. MDM       Procedures    Chief Complaint   Patient presents with    Fall       5:55 PM  The patients presenting problems have been discussed, and they are in agreement with the care plan formulated and outlined with them. I have encouraged them to ask questions as they arise throughout their visit.     MEDICATIONS GIVEN:  Medications - No data to display    LABS REVIEWED:  Labs Reviewed SAMPLES BEING HELD   TROPONIN I   METABOLIC PANEL, COMPREHENSIVE   CBC WITH AUTOMATED DIFF   CK W/ CKMB & INDEX   URINALYSIS W/ RFLX MICROSCOPIC       RADIOLOGY RESULTS:  The following have been ordered and reviewed:  _____________________________________________________________________  _____________________________________________________________________    EKG interpretation:   Rhythm: normal sinus rhythm; and regular . Rate (approx.): 82; Axis: normal; P wave: normal; QRS interval: normal ; ST/T wave: normal; Negative acute significant segmental elevations/ unchanged compared to study dated 07/28/2017    PROCEDURES:        CONSULTATIONS:       PROGRESS NOTES:      DIAGNOSIS:    1. Syncope and collapse    2. Dementia without behavioral disturbance, unspecified dementia type ([facility]        PLAN:  1- admit/ trend enzymes/ monitor;       ED COURSE: The patients hospital course has been uncomplicated. 6:44 PM  'doing ok'; agrees w/ evaluation for admission;      Hospitalist Robbyext for Admission  6:53 PM    ED Room Number: SER12/12  Patient Name and age: Bhumi Beauchamp 82 y.o.  female  Working Diagnosis:   1. Syncope and collapse    2.  Dementia without behavioral disturbance, unspecified dementia type ([facility]      Readmission: no  Isolation Requirements:  no  Recommended Level of Care:  telemetry  Code Status:  Full Code  Department:[facility] - [phone]  Other:  Syncope/ found in floor at home/ need monitoring/ demented

## 2019-09-27 NOTE — ROUTINE PROCESS
TRANSFER - OUT REPORT: 
 
Verbal report given to Sierra Christianson RN(name) on Roxborochristine Lat  being transferred to [facility](unit) for routine progression of care Report consisted of patients Situation, Background, Assessment and  
Recommendations(SBAR). Information from the following report(s) SBAR and ED Summary was reviewed with the receiving nurse. Lines:  
Peripheral IV 09/27/19 Right Antecubital (Active) Site Assessment Clean, dry, & intact 9/27/2019  5:59 PM  
Phlebitis Assessment 0 9/27/2019  5:59 PM  
Infiltration Assessment 0 9/27/2019  5:59 PM  
Dressing Status Clean, dry, & intact 9/27/2019  5:59 PM  
Dressing Type Transparent 9/27/2019  5:59 PM  
Hub Color/Line Status Pink 9/27/2019  5:59 PM  
Action Taken Blood drawn 9/27/2019  5:59 PM  
  
 
Opportunity for questions and clarification was provided.

## 2019-09-27 NOTE — ED TRIAGE NOTES
Pt was home by herself and when her grandson came home he found her on the floor. Pt states that she got dizzy and fell down, pt states that she did hit her head but denies LOC. Pt is not on blood thinners.

## 2019-09-28 ENCOUNTER — APPOINTMENT (OUTPATIENT)
Dept: NON INVASIVE DIAGNOSTICS | Age: 84
DRG: 069 | End: 2019-09-28
Attending: HOSPITALIST
Payer: MEDICARE

## 2019-09-28 ENCOUNTER — APPOINTMENT (OUTPATIENT)
Dept: MRI IMAGING | Age: 84
DRG: 069 | End: 2019-09-28
Attending: PSYCHIATRY & NEUROLOGY
Payer: MEDICARE

## 2019-09-28 ENCOUNTER — APPOINTMENT (OUTPATIENT)
Dept: VASCULAR SURGERY | Age: 84
DRG: 069 | End: 2019-09-28
Attending: FAMILY MEDICINE
Payer: MEDICARE

## 2019-09-28 PROBLEM — I63.9 ACUTE CVA (CEREBROVASCULAR ACCIDENT) (HCC): Status: ACTIVE | Noted: 2019-09-28

## 2019-09-28 LAB
ATRIAL RATE: 83 BPM
CALCULATED P AXIS, ECG09: 45 DEGREES
CALCULATED R AXIS, ECG10: 25 DEGREES
CALCULATED T AXIS, ECG11: 92 DEGREES
CHOLEST SERPL-MCNC: 232 MG/DL
DIAGNOSIS, 93000: NORMAL
ECHO AO ROOT DIAM: 3.33 CM
ECHO AV PEAK GRADIENT: 7.7 MMHG
ECHO AV PEAK VELOCITY: 138.96 CM/S
ECHO AV REGURGITANT PHT: 581.5 CM
ECHO LA AREA 4C: 32 CM2
ECHO LA MAJOR AXIS: 5.08 CM
ECHO LA TO AORTIC ROOT RATIO: 1.53
ECHO LA VOL 2C: 120.35 ML (ref 22–52)
ECHO LA VOL 4C: 128.06 ML (ref 22–52)
ECHO LA VOL BP: 132.45 ML (ref 22–52)
ECHO LA VOL/BSA BIPLANE: 96.79 ML/M2 (ref 16–28)
ECHO LA VOLUME INDEX A2C: 87.95 ML/M2 (ref 16–28)
ECHO LA VOLUME INDEX A4C: 93.58 ML/M2 (ref 16–28)
ECHO LV E' LATERAL VELOCITY: 6.82 CM/S
ECHO LV E' SEPTAL VELOCITY: 6.55 CM/S
ECHO LV INTERNAL DIMENSION DIASTOLIC: 3.3 CM (ref 3.9–5.3)
ECHO LV INTERNAL DIMENSION SYSTOLIC: 2.33 CM
ECHO LV IVSD: 0.86 CM (ref 0.6–0.9)
ECHO LV MASS 2D: 80 G (ref 67–162)
ECHO LV MASS INDEX 2D: 58.5 G/M2 (ref 43–95)
ECHO LV POSTERIOR WALL DIASTOLIC: 0.86 CM (ref 0.6–0.9)
ECHO MV A VELOCITY: 79.26 CM/S
ECHO MV AREA PHT: 3.4 CM2
ECHO MV E DECELERATION TIME (DT): 221.4 MS
ECHO MV E VELOCITY: 81.94 CM/S
ECHO MV E/A RATIO: 1.03
ECHO MV E/E' LATERAL: 12.01
ECHO MV E/E' RATIO (AVERAGED): 12.26
ECHO MV E/E' SEPTAL: 12.51
ECHO MV PRESSURE HALF TIME (PHT): 64.2 MS
ECHO MV REGURGITANT RADIUS PISA: 0.56 CM
ECHO PULMONARY ARTERY SYSTOLIC PRESSURE (PASP): 45 MMHG
ECHO PV MAX VELOCITY: 43.15 CM/S
ECHO PV PEAK GRADIENT: 0.7 MMHG
ECHO TV REGURGITANT MAX VELOCITY: 289.96 CM/S
ECHO TV REGURGITANT PEAK GRADIENT: 33.6 MMHG
EST. AVERAGE GLUCOSE BLD GHB EST-MCNC: 108 MG/DL
FERRITIN SERPL-MCNC: 941 NG/ML (ref 26–388)
HBA1C MFR BLD: 5.4 % (ref 4.2–6.3)
HDLC SERPL-MCNC: 50 MG/DL
HDLC SERPL: 4.6 {RATIO} (ref 0–5)
IRON SATN MFR SERPL: 36 % (ref 20–50)
IRON SERPL-MCNC: 72 UG/DL (ref 35–150)
LDLC SERPL CALC-MCNC: 165 MG/DL (ref 0–100)
LIPID PROFILE,FLP: ABNORMAL
P-R INTERVAL, ECG05: 94 MS
PISA AR MAX VEL: 303.72 CM/S
Q-T INTERVAL, ECG07: 392 MS
QRS DURATION, ECG06: 76 MS
QTC CALCULATION (BEZET), ECG08: 460 MS
TIBC SERPL-MCNC: 200 UG/DL (ref 250–450)
TRIGL SERPL-MCNC: 85 MG/DL (ref ?–150)
VENTRICULAR RATE, ECG03: 83 BPM
VIT B12 SERPL-MCNC: 321 PG/ML (ref 193–986)
VLDLC SERPL CALC-MCNC: 17 MG/DL

## 2019-09-28 PROCEDURE — 83540 ASSAY OF IRON: CPT

## 2019-09-28 PROCEDURE — 65660000000 HC RM CCU STEPDOWN

## 2019-09-28 PROCEDURE — 36415 COLL VENOUS BLD VENIPUNCTURE: CPT

## 2019-09-28 PROCEDURE — 74011250637 HC RX REV CODE- 250/637: Performed by: HOSPITALIST

## 2019-09-28 PROCEDURE — 80061 LIPID PANEL: CPT

## 2019-09-28 PROCEDURE — 65270000029 HC RM PRIVATE

## 2019-09-28 PROCEDURE — 82728 ASSAY OF FERRITIN: CPT

## 2019-09-28 PROCEDURE — 97165 OT EVAL LOW COMPLEX 30 MIN: CPT

## 2019-09-28 PROCEDURE — 93880 EXTRACRANIAL BILAT STUDY: CPT

## 2019-09-28 PROCEDURE — 82607 VITAMIN B-12: CPT

## 2019-09-28 PROCEDURE — 82747 ASSAY OF FOLIC ACID RBC: CPT

## 2019-09-28 PROCEDURE — 74011250636 HC RX REV CODE- 250/636: Performed by: HOSPITALIST

## 2019-09-28 PROCEDURE — 93306 TTE W/DOPPLER COMPLETE: CPT

## 2019-09-28 PROCEDURE — 70551 MRI BRAIN STEM W/O DYE: CPT

## 2019-09-28 PROCEDURE — 83036 HEMOGLOBIN GLYCOSYLATED A1C: CPT

## 2019-09-28 PROCEDURE — 97535 SELF CARE MNGMENT TRAINING: CPT

## 2019-09-28 RX ADMIN — LEVOTHYROXINE SODIUM 88 MCG: 88 TABLET ORAL at 06:44

## 2019-09-28 RX ADMIN — METOPROLOL TARTRATE 50 MG: 25 TABLET ORAL at 21:41

## 2019-09-28 RX ADMIN — ATORVASTATIN CALCIUM 20 MG: 20 TABLET, FILM COATED ORAL at 21:41

## 2019-09-28 RX ADMIN — LOSARTAN POTASSIUM 50 MG: 50 TABLET, FILM COATED ORAL at 08:24

## 2019-09-28 RX ADMIN — METOPROLOL TARTRATE 25 MG: 25 TABLET ORAL at 08:24

## 2019-09-28 RX ADMIN — ASPIRIN 325 MG ORAL TABLET 325 MG: 325 PILL ORAL at 17:03

## 2019-09-28 RX ADMIN — HYDROCHLOROTHIAZIDE 12.5 MG: 25 TABLET ORAL at 08:24

## 2019-09-28 RX ADMIN — ENOXAPARIN SODIUM 30 MG: 30 INJECTION SUBCUTANEOUS at 22:23

## 2019-09-28 RX ADMIN — DOCUSATE SODIUM 100 MG: 100 CAPSULE, LIQUID FILLED ORAL at 08:24

## 2019-09-28 RX ADMIN — PANTOPRAZOLE SODIUM 40 MG: 40 TABLET, DELAYED RELEASE ORAL at 06:44

## 2019-09-28 NOTE — PROGRESS NOTES
Problem: TIA/CVA Stroke: 0-24 hours  Goal: Activity/Safety  Outcome: Progressing Towards Goal  Goal: Consults, if ordered  Outcome: Progressing Towards Goal  Goal: Diagnostic Test/Procedures  Outcome: Progressing Towards Goal  Goal: Nutrition/Diet  Outcome: Progressing Towards Goal  Goal: Discharge Planning  Outcome: Progressing Towards Goal  Goal: Medications  Outcome: Progressing Towards Goal  Goal: Respiratory  Outcome: Progressing Towards Goal  Goal: Treatments/Interventions/Procedures  Outcome: Progressing Towards Goal  Goal: Psychosocial  Outcome: Progressing Towards Goal  Goal: *Hemodynamically stable  Outcome: Progressing Towards Goal  Goal: *Neurologically stable  Description  Absence of additional neurological deficits    Outcome: Progressing Towards Goal     Problem: Falls - Risk of  Goal: *Absence of Falls  Description  Document [facility] Fall Risk and appropriate interventions in the flowsheet.   Outcome: Progressing Towards Goal  Note:   Fall Risk Interventions:  Mobility Interventions: Bed/chair exit alarm, PT Consult for mobility concerns, OT consult for ADLs    Mentation Interventions: Bed/chair exit alarm, Door open when patient unattended, Family/sitter at bedside    Medication Interventions: Bed/chair exit alarm, Patient to call before getting OOB         History of Falls Interventions: Bed/chair exit alarm, Door open when patient unattended, Investigate reason for fall

## 2019-09-28 NOTE — ED NOTES
Pt's family report sudden onset of L. Side facial droop with slurred speech. Obvious L. Side facial droop noted with slurred speech. Md informed and at bedside. Code neuro level 1 called.

## 2019-09-28 NOTE — ED NOTES
Dr. Salbador Castillo assessing pt. Via tele Neurology. Minimal L. Side facial droop noted.  Speech clear

## 2019-09-28 NOTE — PROGRESS NOTES
Bedside and Verbal shift change report given to Alfredo oconnor RN and Giovanni [facility] [address], RN (oncoming nurse) by Juan J Suresh RN (offgoing nurse).  Report included the following information SBAR, Kardex, Intake/Output, MAR, Recent Results, Med Rec Status and Cardiac Rhythm SR.

## 2019-09-28 NOTE — ED NOTES
8:11 PM  Patient was seen earlier by Dr. Vinita Riggs. Patient had a fall. She says that she hit her face when she fell. She was on the floor for some unknown time. She had a negative head CT and C-spine CT. She is getting ready to be admitted to the hospital.    I was called in to see her because the family has now noticed she has a left-sided facial droop. That started just recently in the last few minutes. Patient has no weakness or numbness of arms or legs. She does have a left-sided facial droop currently. Will call code stroke.   She does have a bed ready at [facility].    8:24 PM  CONSULT  I spoke to Dr Gaurang Gifford from [facility]  She will eval pt  Her thought is it could have been facial trauma causing swelling causing facial droop  She agrees w/ CTA head and neck here  She will see pt    8:51 PM  CONSULT  Dr Tuan Rodríguez from [facility] states no TPA  Admission for MRI  She looked at CTA and no major blockages

## 2019-09-28 NOTE — PROGRESS NOTES
Bedside shift change report given to Yolanda Borjas (oncoming nurse) by Elliott Del Toro RN (offgoing nurse). Report included the following information SBAR, Kardex, Intake/Output, MAR, Recent Results and Cardiac Rhythm NSR.        I have read and agree with Cammie Sun's documentation as her preceptor

## 2019-09-28 NOTE — CONSULTS
Neuro: MRI w/o acute stroke. She seems to be doing better since her fall. CTA significant for intracranial atherosclerosis and notable L PCOM aneurysm. Recommend NIS consult. , not urgent, tomorrow is acceptable. Signing off, call if needed.     [address] [facility],   Neurologist  Diplomate, American Board of Psychiatry and Neurology  Board Certified, Adult Neurology and Brain Injury Medicine

## 2019-09-28 NOTE — PROGRESS NOTES
Problem: Self Care Deficits Care Plan (Adult)  Goal: *Acute Goals and Plan of Care (Insert Text)  Description  FUNCTIONAL STATUS PRIOR TO ADMISSION: Patient was supervision for basic and instrumental ADLs. HOME SUPPORT: The patient lived with daughter. Normally walks short distances without assistance. Daughter notices recent decline in balance in recent weeks. Occupational Therapy Goals  Initiated 9/28/2019   1. Patient will perform bathing with supervision/set-up within 7 day(s). 2.  Patient will perform lower body dressing with supervision/set-up within 7 day(s). 3.  Patient will perform toilet transfers with supervision/set-up within 7 day(s). 4.  Patient will perform all aspects of toileting with supervision/set-up within 7 day(s). 5.  Patient will participate in upper extremity therapeutic exercise/activities with supervision/set-up for 10 minutes within 7 day(s). Outcome: Progressing Towards Goal    OCCUPATIONAL THERAPY EVALUATION  Patient: Kev Logan (11 y.o. female)  Date: 9/28/2019  Primary Diagnosis: Syncope [R55]        Precautions: fall       ASSESSMENT  Based on the objective data described below, the patient presents with minor balance impairments with L ankle instability, baseline dementia and need for CGA for mobility and some ADLS    Current Level of Function Impacting Discharge (ADLs/self-care): CGA-Min A LB ADLS    Functional Outcome Measure: The patient scored Total A-D  Total A-D (Motor Function): 65/66 on the Fugl-Newton Assessment which is indicative of mild impairment in upper extremity functional status. Other factors to consider for discharge: [facility] 24/7 care     Patient will benefit from skilled therapy intervention to address the above noted impairments.        PLAN :  Recommendations and Planned Interventions: self care training, functional mobility training, therapeutic exercise, therapeutic activities, endurance activities, patient education, home safety training and family training/education    Frequency/Duration: Patient will be followed by occupational therapy 3 times a week to address goals. Recommendation for discharge: (in order for the patient to meet his/her long term goals)  Occupational therapy at least 2 days/week in the home AND ensure assist and/or supervision for safety with mobility     This discharge recommendation:  A follow-up discussion with the attending provider and/or case management is planned    Equipment recommendations for successful discharge (if) home: TBD by PT regarding AD for walking        SUBJECTIVE:   Patient stated the food is good.     OBJECTIVE DATA SUMMARY:   HISTORY:   Past Medical History:   Diagnosis Date    Anemia of chronic disease 11/03    Formanek-hem    Carotid artery disease ([address]     repeat dopplers 3/14    Dehydration     Dementia     Goiter     s/p thyroidectomy, benign    Lumbar disc disease     ruptured disc L4-L5 s/p epidural  Hernando/os    Osteoporosis, unspecified 12/03    Pure hypercholesterolemia 11/03    Unspecified essential hypertension      Past Surgical History:   Procedure Laterality Date    HX COLONOSCOPY      PUCT/ASPIR BREAST CYST,EACH ADDN  2/04    right breast    THYROIDECTOMY  5/10    benign goiter  Brown/surg     Expanded or extensive additional review of patient history:     Home Situation  Home Environment: Private residence  # Steps to Enter: 3  One/Two Story Residence: One story  Living Alone: No  Support Systems: Child(serena)  Patient Expects to be Discharged to[de-identified] Private residence  Current DME Used/Available at Home: None    Hand dominance: Right    EXAMINATION OF PERFORMANCE DEFICITS:  Cognitive/Behavioral Status:                  Cooperative     Skin: intact    Edema: none    Hearing:   Auditory  Auditory Impairment: None    Vision/Perceptual:                           Acuity: Within Defined Limits         Range of Motion:    AROM: Within functional limits  PROM: Within functional limits Strength:    Strength: Generally decreased, functional                Coordination:  Coordination: Generally decreased, functional  Fine Motor Skills-Upper: Left Intact; Right Intact    Gross Motor Skills-Upper: Left Intact; Right Intact    Tone & Sensation:    Tone: Normal  Sensation: Intact                      Balance:  Sitting: Intact  Standing: Impaired  Standing - Static: Good  Standing - Dynamic : Fair    Functional Mobility and Transfers for ADLs:  Bed Mobility:  Supine to Sit: Contact guard assistance  Scooting: Contact guard assistance    Transfers:  Sit to Stand: Stand-by assistance  Stand to Sit: Stand-by assistance  Bed to Chair: Contact guard assistance  Bathroom Mobility: Contact guard assistance    ADL Assessment:  Feeding: Supervision    Oral Facial Hygiene/Grooming: Supervision    Bathing: Minimum assistance    Upper Body Dressing: Setup    Lower Body Dressing: Contact guard assistance    Toileting: Contact guard assistance                ADL Intervention and task modifications:                           Lower Body Dressing Assistance  Socks: Contact guard assistance               Functional Measure:  Fugl-Newton Assessment of Motor Recovery after Stroke:   Reflex Activity  Flexors/Biceps/Fingers: Can be elicited  Extensors/Triceps: Can be elicited  Reflex Subtotal: 4    Volitional Movement Within Synergies  Shoulder Retraction: Full  Shoulder Elevation: Full  Shoulder Abduction (90 degrees): Full  Shoulder External Rotation: Full  Elbow Flexion: Full  Forearm Supination: Full  Shoulder Adduction/Internal Rotation: Full  Elbow Extension: Full  Forearm Pronation: Full  Subtotal: 18    Volitional Movement Mixing Synergies  Hand to Lumbar Spine: Full  Shoulder Flexion (0-90 degrees): Full  Pronation-Supination: Full  Subtotal: 6    Volitional Movement With Little or No Synergy  Shoulder Abduction (0-90 degrees): Full  Shoulder Flexion ( degrees): Full  Pronation/Supination: Full  Subtotal : 6    Normal Reflex Activity  Biceps, Triceps, Finger Flexors: Full  Subtotal : 2    Upper Extremity Total   Upper Extremity Total: 36    Wrist  Stability at 15 Degree Dorsiflexion: Full  Repeated Dorsiflexion/ Volar Flexion: Full  Stability at 15 Degree Dorsiflexion: Full  Repeated Dorsiflexion/ Volar Flexion: Full  Circumduction: Full  Wrist Total: 10    Hand  Mass Flexion: Full  Mass Extension: Full  Grasp A: Full  Grasp B: Full  Grasp C: Full  Grasp D: Full  Grasp E: Full  Hand Total: 14    Coordination/Speed  Tremor: Slight  Dysmetria: None  Time: <1s  Coordination/Speed Total : 5    Total A-D  Total A-D (Motor Function): 65/66     This is a reliable/valid measure of arm function after a neurological event. It has established value to characterize functional status and for measuring spontaneous and therapy-induced recovery; tests proximal and distal motor functions. Fugl-Newton Assessment - UE scores recorded between five and 30 days post neurologic event can be used to predict UE recovery at six months post neurologic event. Severe = 0-21 points   Moderately Severe = 22-33 points   Moderate = 34-47 points   Mild = 48-66 points  HAMLET Reza, ANDREA Soto, & BUSHRA Garzon (1992). Measurement of motor recovery after stroke: Outcome assessment and sample size requirements.  Stroke, 23, pp. 7783-8571.   ------------------------------------------------------------------------------------------------------------------------------------------------------------------  MCID:  Stroke:   Yolette Jaramillo, 2001; n = 171; mean age 79 (5) years; assessed within 16 (12) days of stroke, Acute Stroke)  FMA Motor Scores from Admission to Discharge   10 point increase in FMA Upper Extremity = 1.5 change in discharge FIM   10 point increase in FMA Lower Extremity = 1.9 change in discharge FIM  MDC:   Stroke:   Billie Connor et al, 2008, n = 14, mean age = 59.9 (14.6) years, assessed on average 14 (6.5) months post stroke, Chronic Stroke)   FMA = 5.2 points for the Upper Extremity portion of the assessment     Occupational Therapy Evaluation Charge Determination   History Examination Decision-Making   LOW Complexity : Brief history review  LOW Complexity : 1-3 performance deficits relating to physical, cognitive , or psychosocial skils that result in activity limitations and / or participation restrictions  LOW Complexity : No comorbidities that affect functional and no verbal or physical assistance needed to complete eval tasks       Based on the above components, the patient evaluation is determined to be of the following complexity level: LOW   Pain Rating:  none    Activity Tolerance:   Good  Please refer to the flowsheet for vital signs taken during this treatment. After treatment patient left in no apparent distress:    Sitting in chair and Caregiver / family present    COMMUNICATION/EDUCATION:   The patients plan of care was discussed with: Registered Nurse. Patient was educated regarding her deficit(s) of minor balance deficits as this relates to her diagnosis of TIA. She demonstrated Fair understanding as evidenced by verbal feedback. Patient and/or family was verbally educated on the BE FAST acronym for signs/symptoms of CVA and TIA. BE FAST was written on patient's communication board  for visual education and reinforcement. All questions answered with patient indicating fair understanding. Home safety education was provided and the patient/caregiver indicated understanding., Patient/family have participated as able in goal setting and plan of care. and Patient/family agree to work toward stated goals and plan of care. This patients plan of care is appropriate for delegation to [facility].     Thank you for this referral.  Rony Miller  Time Calculation: 20 mins

## 2019-09-28 NOTE — CONSULTS
INPATIENT NEUROLOGY CONSULTATION  9/28/2019     Consulted by: Ned Charlton MD        Patient ID:  Rosanne Villegas  [id number]  82 y.o.  1935    Chief Complaint   Patient presents with   Caleb Anitra is an 61-year-old woman with coronary artery disease and dementia previously seen by Dr. Nadiya Smith in 2018 for dementia here because she had a fall. She tells me yesterday she was at home reading and she got up to go to the kitchen to get something to drink and she fell and she hit her head. She denies losing consciousness. She tells me her grandson came up to help her get up. Her daughter then also was present. There was concern for new left facial asymmetry and she was brought to the hospital.  She tells me she does not take aspirin every day. She denies headache at the moment and tells me she feels her baseline. No family present to confirm. 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 She tells me she has had surgery? Daniel Ace Unfortunately I am not sure how accurate her history is given the previous diagnosis of dementia. Review of Systems   Unable to perform ROS: Dementia   Neurological: Negative for headaches.        Past Medical History:   Diagnosis Date    Anemia of chronic disease 11/03    [place]-hem    Carotid artery disease (HCC)     repeat dopplers 3/14    Dehydration     Dementia     Goiter     s/p thyroidectomy, benign    Lumbar disc disease     ruptured disc L4-L5 s/p epidural  [place]/os    Osteoporosis, unspecified 12/03    Pure hypercholesterolemia 11/03    Unspecified essential hypertension      Family History   Problem Relation Age of Onset    Hypertension Mother     Hypertension Father      Social History     Socioeconomic History    Marital status:      Spouse name: Not on file    Number of children: Not on file    Years of education: Not on file    Highest education level: Not on file Occupational History    Not on file   Social Needs    Financial resource strain: Not on file    Food insecurity:     Worry: Not on file     Inability: Not on file    Transportation needs:     Medical: Not on file     Non-medical: Not on file   Tobacco Use    Smoking status: Never Smoker    Smokeless tobacco: Never Used   Substance and Sexual Activity    Alcohol use: No    Drug use: No    Sexual activity: Not Currently   Lifestyle    Physical activity:     Days per week: Not on file     Minutes per session: Not on file    Stress: Not on file   Relationships    Social connections:     Talks on phone: Not on file     Gets together: Not on file     Attends Jainism service: Not on file     Active member of club or organization: Not on file     Attends meetings of clubs or organizations: Not on file     Relationship status: Not on file    Intimate partner violence:     Fear of current or ex partner: Not on file     Emotionally abused: Not on file     Physically abused: Not on file     Forced sexual activity: Not on file   Other Topics Concern     Service Not Asked    Blood Transfusions Not Asked    Caffeine Concern Not Asked    Occupational Exposure Not Asked    Hobby Hazards Not Asked    Sleep Concern Not Asked    Stress Concern Not Asked    Weight Concern Not Asked    Special Diet Not Asked    Back Care Not Asked    Exercise Yes     Comment: walks    Bike Helmet Not Asked    Seat Belt Not Asked    Self-Exams Not Asked   Social History Narrative    Lives with her daughter's family         Current Facility-Administered Medications   Medication Dose Route Frequency    influenza vaccine 2019-20 (6 mos+)(PF) (FLUARIX/FLULAVAL/FLUZONE QUAD) injection 0.5 mL  0.5 mL IntraMUSCular PRIOR TO DISCHARGE    acetaminophen (TYLENOL) tablet 650 mg  650 mg Oral Q4H PRN    Or    acetaminophen (TYLENOL) solution 650 mg  650 mg Per NG tube Q4H PRN    Or    acetaminophen (TYLENOL) suppository 650 mg  650 mg Rectal Q4H PRN    aspirin tablet 325 mg  325 mg Oral DAILY    labetalol (NORMODYNE;TRANDATE) injection 5 mg  5 mg IntraVENous Q10MIN PRN    docusate sodium (COLACE) capsule 100 mg  100 mg Oral BID    ondansetron (ZOFRAN) injection 4 mg  4 mg IntraVENous Q6H PRN    atorvastatin (LIPITOR) tablet 20 mg  20 mg Oral QHS    levothyroxine (SYNTHROID) tablet 88 mcg  88 mcg Oral ACB    metoprolol tartrate (LOPRESSOR) tablet 25 mg  25 mg Oral DAILY    metoprolol tartrate (LOPRESSOR) tablet 50 mg  50 mg Oral QHS    enoxaparin (LOVENOX) injection 30 mg  30 mg SubCUTAneous Q24H    pantoprazole (PROTONIX) tablet 40 mg  40 mg Oral ACB    losartan (COZAAR) tablet 50 mg  50 mg Oral DAILY    And    hydroCHLOROthiazide (HYDRODIURIL) tablet 12.5 mg  12.5 mg Oral DAILY     No Known Allergies    Visit Vitals  /55   Pulse (!) 55   Temp 98.1 °F (36.7 °C)   Resp 16   Ht 5' 1\" (1.549 m)   Wt 43.7 kg (96 lb 4.8 oz)   SpO2 100%   BMI 18.20 kg/m²     Physical Exam   Cardiovascular: Normal rate. Pulmonary/Chest: Effort normal.   Skin: Skin is warm and dry. Psychiatric: Her behavior is normal.   Vitals reviewed. Neurologic Exam     Mental Status   Very pleasant elderly woman in bed awake and alert. She can give me some good information but does have to think about her answers briefly. She knows she is at the hospital and the name but does not know the month or the year.   Pupils appear equal, EOMI, some questionable ecchymosis in the eyebrows  Face does appear slightly asymmetric to the left, tongue is midline, speech is dysarthric but she is not aphasic  Extremities 5/5 throughout with good effort supine  Sensation intact grossly  No abnormal movements  Gait deferred due to poor comprehension            Lab Results   Component Value Date/Time    WBC 3.3 (L) 09/27/2019 05:59 PM    HGB 8.0 (L) 09/27/2019 05:59 PM    HCT 25.7 (L) 09/27/2019 05:59 PM    PLATELET 843 78/63/0773 05:59 PM    MCV 72.2 (L) 09/27/2019 05:59 PM     Lab Results   Component Value Date/Time    Hemoglobin A1c 5.4 09/28/2019 05:44 AM    Hemoglobin A1c 5.6 10/23/2018 09:38 AM    Hemoglobin A1c 5.7 (H) 07/06/2017 09:18 AM    Glucose 100 09/27/2019 05:59 PM    Glucose (POC) 98 09/27/2019 08:11 PM    Microalb/Creat ratio (ug/mg creat.) 174.6 (H) 12/04/2014 10:08 AM    LDL, calculated 165 (H) 09/28/2019 05:44 AM    Creatinine 1.36 (H) 09/27/2019 05:59 PM      Lab Results   Component Value Date/Time    Cholesterol, total 232 (H) 09/28/2019 05:44 AM    HDL Cholesterol 50 09/28/2019 05:44 AM    LDL, calculated 165 (H) 09/28/2019 05:44 AM    Triglyceride 85 09/28/2019 05:44 AM    CHOL/HDL Ratio 4.6 09/28/2019 05:44 AM     Lab Results   Component Value Date/Time    ALT (SGPT) 13 09/27/2019 05:59 PM    AST (SGOT) 19 09/27/2019 05:59 PM    Alk. phosphatase 92 09/27/2019 05:59 PM    Bilirubin, total 0.2 09/27/2019 05:59 PM    Albumin 3.0 (L) 09/27/2019 05:59 PM    Protein, total 7.6 09/27/2019 05:59 PM    PLATELET 869 17/62/2508 05:59 PM        CT Results (maximum last 3): Results from Hospital Encounter encounter on 09/27/19   CTA CODE NEURO HEAD AND NECK W CONT    Narrative PRELIMINARY REPORT:    On the delayed images there is no enhancing mass lesion dural sinuses enhance  normally    There is marked atherosclerotic disease at the origin of the bilateral internal  carotid arteries there is atherosclerotic plaque at the origin of the bilateral  vertebral arteries which are otherwise patent and codominant    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 Minimal atherosclerotic narrowing diffusely involving the anterior cerebral  artery and middle cerebral arteries. There is however no flow-limiting stenosis.   Mild atherosclerotic narrowing throughout the basilar artery and atherosclerotic  disease involving the distal vertebral arteries as well Preliminary report was provided by Dr. Joy Fernandez, the on-call radiologist, at 2057    Final report to follow. CT SPINE CERV WO CONT    Narrative INDICATION:  Recent trauma, spine fall today and hit head    STUDY:  CT SPINE CERV WO CONT     Examination: Cervical spine CT. No contrast or comparisons. Thin section axial  images were obtained with sagittal and coronal reformats. CT dose reduction was  achieved through use of a standardized protocol tailored for this examination  and automatic exposure control for dose modulation. FINDINGS: There is a hyperlordosis of the cervical spine. Bones are osteopenic. There is no bony destructive lesion or evidence of acute fracture. Paraspinous  soft tissues are within normal limits. Multilevel degenerative change. Impression IMPRESSION:  1. No acute abnormality          MRI Results (maximum last 3): Results from Abstract encounter on 06/23/11   MRI SPINE LUMBAR WITHOUT CONTRAST       VAS/US/Carotid Doppler Results (maximum last 3): Results from [place] encounter on 04/14/15   DUPLEX CAROTID BILATERAL       PET Results (maximum last 3): No results found for this or any previous visit. Assessment and Plan        82-year-old woman with dementia and stroke risk factors who had a fall. She tells me she did hit her head. On exam her speech is dysarthric but I do not know her baseline and I do agree there is a very questionable left facial asymmetry. Unclear if the aneurysm history is something previously known. I cannot find any records in our system confirming this. Awaiting final CTA read. MRI is pending which is appropriate. Stay on baby aspirin for now. Following      This clinical note was dictated with an electronic dictation software that can make unintentional errors. If there are any questions, please contact me directly for clarification.       2 [facility], DO  NEUROLOGIST  Diplomate ABPN  9/28/2019

## 2019-09-28 NOTE — PROGRESS NOTES
Problem: TIA/CVA Stroke: 0-24 hours  Goal: Activity/Safety  Outcome: Progressing Towards Goal  Goal: Consults, if ordered  Outcome: Progressing Towards Goal  Goal: Diagnostic Test/Procedures  Outcome: Progressing Towards Goal  Goal: Nutrition/Diet  Outcome: Progressing Towards Goal  Goal: Discharge Planning  Outcome: Progressing Towards Goal  Goal: Medications  Outcome: Progressing Towards Goal  Goal: Respiratory  Outcome: Progressing Towards Goal  Goal: Treatments/Interventions/Procedures  Outcome: Progressing Towards Goal  Goal: Minimize risk of bleeding post-thrombolytic infusion  Outcome: Progressing Towards Goal  Goal: Monitor for complications post-thrombolytic infusion  Outcome: Progressing Towards Goal  Goal: Psychosocial  Outcome: Progressing Towards Goal  Goal: *Hemodynamically stable  Outcome: Progressing Towards Goal  Goal: *Neurologically stable  Description  Absence of additional neurological deficits    Outcome: Progressing Towards Goal  Goal: *Verbalizes anxiety and depression are reduced or absent  Outcome: Progressing Towards Goal  Goal: *Absence of Signs of Aspiration on Current Diet  Outcome: Progressing Towards Goal  Goal: *Absence of deep venous thrombosis signs and symptoms(Stroke Metric)  Outcome: Progressing Towards Goal  Goal: *Ability to perform ADLs and demonstrates progressive mobility and function  Outcome: Progressing Towards Goal  Goal: *Stroke education started(Stroke Metric)  Outcome: Progressing Towards Goal  Goal: *Dysphagia screen performed(Stroke Metric)  Outcome: Progressing Towards Goal  Goal: *Rehab consulted(Stroke Metric)  Outcome: Progressing Towards Goal     Problem: TIA/CVA Stroke: Day 2 Until Discharge  Goal: Activity/Safety  Outcome: Progressing Towards Goal  Goal: Diagnostic Test/Procedures  Outcome: Progressing Towards Goal  Goal: Nutrition/Diet  Outcome: Progressing Towards Goal  Goal: Discharge Planning  Outcome: Progressing Towards Goal  Goal: Medications  Outcome: Progressing Towards Goal  Goal: Respiratory  Outcome: Progressing Towards Goal  Goal: Treatments/Interventions/Procedures  Outcome: Progressing Towards Goal  Goal: Psychosocial  Outcome: Progressing Towards Goal  Goal: *Verbalizes anxiety and depression are reduced or absent  Outcome: Progressing Towards Goal  Goal: *Absence of aspiration  Outcome: Progressing Towards Goal  Goal: *Absence of deep venous thrombosis signs and symptoms(Stroke Metric)  Outcome: Progressing Towards Goal  Goal: *Optimal pain control at patient's stated goal  Outcome: Progressing Towards Goal  Goal: *Tolerating diet  Outcome: Progressing Towards Goal  Goal: *Ability to perform ADLs and demonstrates progressive mobility and function  Outcome: Progressing Towards Goal  Goal: *Stroke education continued(Stroke Metric)  Outcome: Progressing Towards Goal     Problem: Ischemic Stroke: Discharge Outcomes  Goal: *Verbalizes anxiety and depression are reduced or absent  Outcome: Progressing Towards Goal  Goal: *Verbalize understanding of risk factor modification(Stroke Metric)  Outcome: Progressing Towards Goal  Goal: *Hemodynamically stable  Outcome: Progressing Towards Goal  Goal: *Absence of aspiration pneumonia  Outcome: Progressing Towards Goal  Goal: *Aware of needed dietary changes  Outcome: Progressing Towards Goal  Goal: *Verbalize understanding of prescribed medications including anti-coagulants, anti-lipid, and/or anti-platelets(Stroke Metric)  Outcome: Progressing Towards Goal  Goal: *Tolerating diet  Outcome: Progressing Towards Goal  Goal: *Aware of follow-up diagnostics related to anticoagulants  Outcome: Progressing Towards Goal  Goal: *Ability to perform ADLs and demonstrates progressive mobility and function  Outcome: Progressing Towards Goal  Goal: *Absence of DVT(Stroke Metric)  Outcome: Progressing Towards Goal  Goal: *Absence of aspiration  Outcome: Progressing Towards Goal  Goal: *Optimal pain control at patient's stated goal  Outcome: Progressing Towards Goal  Goal: *Home safety concerns addressed  Outcome: Progressing Towards Goal  Goal: *Describes available resources and support systems  Outcome: Progressing Towards Goal  Goal: *Verbalizes understanding of activation of EMS(911) for stroke symptoms(Stroke Metric)  Outcome: Progressing Towards Goal  Goal: *Understands and describes signs and symptoms to report to providers(Stroke Metric)  Outcome: Progressing Towards Goal  Goal: *Neurolgocially stable (absence of additional neurological deficits)  Outcome: Progressing Towards Goal  Goal: *Verbalizes importance of follow-up with primary care physician(Stroke Metric)  Outcome: Progressing Towards Goal  Goal: *Smoking cessation discussed,if applicable(Stroke Metric)  Outcome: Progressing Towards Goal  Goal: *Depression screening completed(Stroke Metric)  Outcome: Progressing Towards Goal     Problem: Falls - Risk of  Goal: *Absence of Falls  Description  Document Charisse Fall Risk and appropriate interventions in the flowsheet.   Outcome: Progressing Towards Goal  Note:   Fall Risk Interventions:  Mobility Interventions: Bed/chair exit alarm, Assess mobility with egress test, Communicate number of staff needed for ambulation/transfer, OT consult for ADLs, Patient to call before getting OOB, PT Consult for mobility concerns, Utilize walker, cane, or other assistive device, Utilize gait belt for transfers/ambulation    Mentation Interventions: Adequate sleep, hydration, pain control, Bed/chair exit alarm, Door open when patient unattended, More frequent rounding, Reorient patient, Toileting rounds, Update white board    Medication Interventions: Assess postural VS orthostatic hypotension, Bed/chair exit alarm, Patient to call before getting OOB, Teach patient to arise slowly, Utilize gait belt for transfers/ambulation    Elimination Interventions: Bed/chair exit alarm, Elevated toilet seat, Patient to call for help with toileting needs, Toilet paper/wipes in reach, Toileting schedule/hourly rounds    History of Falls Interventions: Bed/chair exit alarm, Consult care management for discharge planning, Door open when patient unattended, Investigate reason for fall, Utilize gait belt for transfer/ambulation, Assess for delayed presentation/identification of injury for 48 hrs (comment for end date), Vital signs minimum Q4HRs X 24 hrs (comment for end date)         Problem: Patient Education: Go to Patient Education Activity  Goal: Patient/Family Education  Outcome: Progressing Towards Goal     Problem: Pain  Goal: *Control of Pain  Outcome: Progressing Towards Goal     Problem: Patient Education: Go to Patient Education Activity  Goal: Patient/Family Education  Outcome: Progressing Towards Goal     Problem: Pressure Injury - Risk of  Goal: *Prevention of pressure injury  Description  Document Fernando Scale and appropriate interventions in the flowsheet. Outcome: Progressing Towards Goal  Note:   Pressure Injury Interventions:       Moisture Interventions: Absorbent underpads, Check for incontinence Q2 hours and as needed, Minimize layers, Offer toileting Q_hr    Activity Interventions: Increase time out of bed, Pressure redistribution bed/mattress(bed type), PT/OT evaluation    Mobility Interventions: Turn and reposition approx.  every two hours(pillow and wedges), Float heels, Pressure redistribution bed/mattress (bed type)    Nutrition Interventions: Document food/fluid/supplement intake, Offer support with meals,snacks and hydration                     Problem: Patient Education: Go to Patient Education Activity  Goal: Patient/Family Education  Outcome: Progressing Towards Goal

## 2019-09-28 NOTE — PROGRESS NOTES
09/28/19 1031 09/28/19 1033 09/28/19 1035   Vital Signs   /47 138/55 143/73   MAP (Calculated) 79 83 96   BP 1 Method Automatic Automatic Automatic   BP 1 Location Left arm Left arm Left arm   BP Patient Position Supine Sitting Standing     BP lying/sitting/standing

## 2019-09-28 NOTE — PROGRESS NOTES
Lovenox Monitoring  Indication: DVT Prophylaxis  Recent Labs     09/27/19  1759   HGB 8.0*      CREA 1.36*     Current Weight: 43.1 kg  Est. CrCl = 22 ml/min  Current Dose: 40 mg subcutaneously every 24 hours. Plan: Change to 30 mg SC q24h for crcl < 30.

## 2019-09-28 NOTE — H&P
History & Physical    Primary Care Provider: Shelly Banks MD  Source of Information: Patient and patient's daughter     History of Presenting Illness: Rosmery Diamond is a 82 y.o. female who presents with syncope and left facial droop     53-year-old -American female with past medical history anemia, carotid artery disease, dehydration, dementia, goiter status post thyroidectomy, lumbar disc disease L4-L5 status post epidural jocelin/loss, osteoporosis high cholesterol and hypertension. Per daughter, patient was sitting and she eating  some corn flakes this morning for breakfast then fell backward possible hit her head. She was found in the floor by her nephew and states she was unable to get out of the floor because she felt too weak. She lives with 1 daughter/presents with a second daughter after being found in the floor today for unknown period of time. In [facility]. Pt's family report  L. Side facial droop with slurred speech which is new. Tele neuro consulted. Not TPA candidate. Currently daughter felt her facial droop resolved. Review of Systems:  General: no fever,no changes of weight or appetite  HEENT: no headache, no vision changes, no nose discharge, no hearing changes   RES: no wheezing, no cough, no sob  CVS: no cp, no palpitation.   Muscular: no joint swelling, chronic mild knee pain   Skin: no rash, no itching   GI: no vomiting, no diarrhea  : no dysuria, no hematuria  Hemo: no gum bleeding, no petechial   Neuro: HPI   Endo: no polydipsia   Psych: denied depression     Past Medical History:   Diagnosis Date    Anemia of chronic disease 11/03    [place]anek-hem    Carotid artery disease (HCC)     repeat dopplers 3/14    Dehydration     Dementia     Goiter     s/p thyroidectomy, benign    Lumbar disc disease     ruptured disc L4-L5 s/p epidural  Hernando/os    Osteoporosis, unspecified 12/03    Pure hypercholesterolemia 11/03    Unspecified essential hypertension       Past Surgical History:   Procedure Laterality Date    HX COLONOSCOPY      PUCT/ASPIR BREAST CYST,EACH ADDN  2/04    right breast    THYROIDECTOMY  5/10    benign goiter  Brown/surg     Prior to Admission medications    Medication Sig Start Date End Date Taking? Authorizing Provider   irbesartan-hydroCHLOROthiazide (AVALIDE) 150-12.5 mg per tablet TAKE 1 TABLET BY MOUTH EVERY DAY 7/27/19   Javier Santos MD   metoprolol tartrate (LOPRESSOR) 25 mg tablet TAKE 1 TABLET BY MOUTH EVERY MORNING AND 2 TABLETS BY MOUTH EVERY EVENING 11/30/18   Javier Santos MD   levothyroxine (SYNTHROID) 88 mcg tablet Take 1 Tab by mouth Daily (before breakfast). Increased dose 10/24/18   Javier Santos MD   atorvastatin (LIPITOR) 20 mg tablet Take 1 Tab by mouth nightly. Needs appointment 9/4/18   Crescencio Sheriff MD   aspirin delayed-release (ASPIR-81) 81 mg tablet Take 81 mg by mouth daily. Provider, Historical     No Known Allergies   Family History   Problem Relation Age of Onset    Hypertension Mother     Hypertension Father         SOCIAL HISTORY:  Patient resides:  Independently    Assisted Living    SNF    With family care x      Smoking history:   None x   Former    Chronic      Alcohol history:   None    Social    Chronic      Ambulates:   Independently x   w/cane    w/walker    w/wc    CODE STATUS:  DNR    Full x   Other      Objective:     Physical Exam:     Visit Vitals  /75 (BP 1 Location: Left arm, BP Patient Position: At rest)   Pulse 83   Temp 97.7 °F (36.5 °C)   Resp 20   Ht 5' 1\" (1.549 m)   Wt 43.1 kg (95 lb)   SpO2 100%   BMI 17.95 kg/m²      O2 Device: Room air    General:  Alert, cooperative, no distress, appears stated age. Head:  Normocephalic, without obvious abnormality, atraumatic. Eyes:  Conjunctivae/corneas clear. PERRL, EOMs intact. Nose: Nares normal. Septum midline. Mucosa normal. No drainage or sinus tenderness.    Throat: Lips, mucosa, and tongue normal. Teeth and gums normal.   Neck: Supple, symmetrical, trachea midline, no adenopathy, thyroid: no enlargement/tenderness/nodules, no carotid bruit and no JVD. Back:   Symmetric, no curvature. ROM normal. No CVA tenderness. Lungs:   Clear to auscultation bilaterally. Chest wall:  No tenderness or deformity. Heart:  Regular rate and rhythm, S1, S2 normal, no murmur, click, rub or gallop. Abdomen:   Soft, non-tender. Bowel sounds normal. No masses,  No organomegaly. Extremities: Extremities normal, atraumatic, no cyanosis or edema. Pulses: 2+ and symmetric all extremities. Skin: Skin color, texture, turgor normal. No rashes or lesions   Neurologic: CNII-XII intact. No focal weakness              Data Review:     Recent Days:  Recent Labs     09/27/19  1759   WBC 3.3*   HGB 8.0*   HCT 25.7*        Recent Labs     09/27/19  1759   *   K 3.4*   CL 97   CO2 28      BUN 19   CREA 1.36*   CA 8.9   ALB 3.0*   SGOT 19   ALT 13     No results for input(s): PH, PCO2, PO2, HCO3, FIO2 in the last 72 hours.     24 Hour Results:  Recent Results (from the past 24 hour(s))   EKG, 12 LEAD, INITIAL    Collection Time: 09/27/19  5:52 PM   Result Value Ref Range    Ventricular Rate 83 BPM    Atrial Rate 83 BPM    P-R Interval 94 ms    QRS Duration 76 ms    Q-T Interval 392 ms    QTC Calculation (Bezet) 460 ms    Calculated P Axis 45 degrees    Calculated R Axis 25 degrees    Calculated T Axis 92 degrees    Diagnosis       Sinus rhythm with short [place]  Minimal voltage criteria for LVH, may be normal variant  Nonspecific ST and T wave abnormality  Abnormal ECG  When compared with ECG of 18-MAY-2010 11:27,  Previous ECG has undetermined rhythm, needs review  Nonspecific T wave abnormality, worse in Inferior leads     SAMPLES BEING HELD    Collection Time: 09/27/19  5:59 PM   Result Value Ref Range    SAMPLES BEING HELD 1RED 1BLUE     COMMENT        Add-on orders for these samples will be processed based on acceptable specimen integrity and analyte stability, which may vary by analyte. TROPONIN I    Collection Time: 09/27/19  5:59 PM   Result Value Ref Range    Troponin-I, Qt. <0.05 <2.94 ng/mL   METABOLIC PANEL, COMPREHENSIVE    Collection Time: 09/27/19  5:59 PM   Result Value Ref Range    Sodium 135 (L) 136 - 145 mmol/L    Potassium 3.4 (L) 3.5 - 5.1 mmol/L    Chloride 97 97 - 108 mmol/L    CO2 28 21 - 32 mmol/L    Anion gap 10 5 - 15 mmol/L    Glucose 100 65 - 100 mg/dL    BUN 19 6 - 20 MG/DL    Creatinine 1.36 (H) 0.55 - 1.02 MG/DL    BUN/Creatinine ratio 14 12 - 20      GFR est AA 45 (L) >60 ml/min/1.73m2    GFR est non-AA 37 (L) >60 ml/min/1.73m2    Calcium 8.9 8.5 - 10.1 MG/DL    Bilirubin, total 0.2 0.2 - 1.0 MG/DL    ALT (SGPT) 13 12 - 78 U/L    AST (SGOT) 19 15 - 37 U/L    Alk. phosphatase 92 45 - 117 U/L    Protein, total 7.6 6.4 - 8.2 g/dL    Albumin 3.0 (L) 3.5 - 5.0 g/dL    Globulin 4.6 (H) 2.0 - 4.0 g/dL    A-G Ratio 0.7 (L) 1.1 - 2.2     CBC WITH AUTOMATED DIFF    Collection Time: 09/27/19  5:59 PM   Result Value Ref Range    WBC 3.3 (L) 3.6 - 11.0 K/uL    RBC 3.56 (L) 3.80 - 5.20 M/uL    HGB 8.0 (L) 11.5 - 16.0 g/dL    HCT 25.7 (L) 35.0 - 47.0 %    MCV 72.2 (L) 80.0 - 99.0 FL    MCH 22.5 (L) 26.0 - 34.0 PG    MCHC 31.1 30.0 - 36.5 g/dL    RDW 15.9 (H) 11.5 - 14.5 %    PLATELET 614 560 - 009 K/uL    MPV 9.8 8.9 - 12.9 FL    NRBC 0.0 0  WBC    ABSOLUTE NRBC 0.00 0.00 - 0.01 K/uL    NEUTROPHILS 52 32 - 75 %    LYMPHOCYTES 32 12 - 49 %    MONOCYTES 13 5 - 13 %    EOSINOPHILS 2 0 - 7 %    BASOPHILS 1 0 - 1 %    IMMATURE GRANULOCYTES 0 0.0 - 0.5 %    ABS. NEUTROPHILS 1.7 (L) 1.8 - 8.0 K/UL    ABS. LYMPHOCYTES 1.0 0.8 - 3.5 K/UL    ABS. MONOCYTES 0.4 0.0 - 1.0 K/UL    ABS. EOSINOPHILS 0.1 0.0 - 0.4 K/UL    ABS. BASOPHILS 0.0 0.0 - 0.1 K/UL    ABS. IMM.  GRANS. 0.0 0.00 - 0.04 K/UL    DF AUTOMATED     CK W/ CKMB & INDEX    Collection Time: 09/27/19  5:59 PM   Result Value Ref Range CK 91 26 - 192 U/L    CK - MB <1.0 <3.6 NG/ML    CK-MB Index Cannot be calculated 0.0 - 2.5     URINALYSIS W/ RFLX MICROSCOPIC    Collection Time: 09/27/19  5:59 PM   Result Value Ref Range    Color YELLOW/STRAW      Appearance CLEAR CLEAR      Specific gravity 1.010 1.003 - 1.030      pH (UA) 7.0 5.0 - 8.0      Protein NEGATIVE  NEG mg/dL    Glucose NEGATIVE  NEG mg/dL    Ketone NEGATIVE  NEG mg/dL    Bilirubin NEGATIVE  NEG      Blood NEGATIVE  NEG      Urobilinogen 0.2 0.2 - 1.0 EU/dL    Nitrites NEGATIVE  NEG      Leukocyte Esterase TRACE (A) NEG     NT-PRO BNP    Collection Time: 09/27/19  5:59 PM   Result Value Ref Range    NT pro- (H) 0 - 450 PG/ML   URINE MICROSCOPIC ONLY    Collection Time: 09/27/19  5:59 PM   Result Value Ref Range    WBC 0-4 0 - 4 /hpf    RBC 0-5 0 - 5 /hpf    Epithelial cells FEW FEW /lpf    Bacteria NEGATIVE  NEG /hpf   GLUCOSE, POC    Collection Time: 09/27/19  8:11 PM   Result Value Ref Range    Glucose (POC) 98 65 - 100 mg/dL    Performed by Jenae Padilla          Imaging:   Ct Head Wo Cont    Result Date: 9/27/2019  IMPRESSION: 1. Volume loss and white matter disease unchanged. No acute abnormality     Ct Spine Cerv Wo Cont    Result Date: 9/27/2019  IMPRESSION: 1. No acute abnormality      Xr Chest Port    Result Date: 9/27/2019  IMPRESSION: No acute findings. CTA OF HEAD NAD NECK   On the delayed images there is no enhancing mass lesion dural sinuses enhance  normally     There is marked atherosclerotic disease at the origin of the bilateral internal  carotid arteries there is atherosclerotic plaque at the origin of the bilateral  vertebral arteries which are otherwise patent and codominant     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     Minimal atherosclerotic narrowing diffusely involving the anterior cerebral  artery and middle cerebral arteries.  There is however no flow-limiting stenosis. Mild atherosclerotic narrowing throughout the basilar artery and atherosclerotic  disease involving the distal vertebral arteries as well   Assessment:     Active Problems:    Syncope (9/27/2019)           Plan:     1. Syncope with transient facial droop: consider CVA, adt to tele. CTA of neck and head : marked atherosclerotic disease at the origin of the bilateral internal  carotid arteries there is atherosclerotic plaque at the origin of the bilateral  vertebral arteries. 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 Will consult neurologist. Brain MRI, echo, A1C, FLP stroke work up. PT/OT/SPL. Continue ASA for now. 2. HTN: continue home meds. Check orthostatic vitals in am   3. HLP: continue lipitor   4. Microcytic anemia: may have chronic iron deficiency. Will check iron panel. Ferritin. B12, folate.  Stool ob        Signed By: Aisha Baez MD     September 27, 2019

## 2019-09-28 NOTE — PHYSICIAN ADVISORY
Letter of Status Determination: Current Status INPATIENT is Appropriate Pt Name:  Jean Pierre Whittington MR#  [id number] [facility]#   [id number] [facility]  672/01  @ [facility]  
Hospitalization date  9/27/2019  5:40 PM  
Current Attending Physician  Afsaneh Weldon MD  
Principal diagnosis  <principal problem not specified> Clinicals  82 y.o. y.o  female hospitalized with 63-year-old -American female with past medical history anemia, carotid artery disease, dehydration, dementia, goiter status post thyroidectomy, lumbar disc disease L4-L5 status post epidural jocelin/loss, osteoporosis high cholesterol and hypertension. Per daughter, patient was sitting and she eating  some corn flakes this morning for breakfast then fell backward possible hit her head.  She was found in the floor by her nephew and states she was unable to get out of the floor because she felt too weak.  She lives with 1 daughter/presents with a second daughter after being found in the floor today for unknown period of time. In [facility]. Pt's family report  L. Side facial droop with slurred speech which is new. Tele neuro consulted. Not TPA candidate. [place] MCG criteria Does  NOT apply CTA-  CRITICAL OCCLUSION DEMONESTRATED 2. Atherosclerotic plaque at the carotid bifurcations with 60% stenosis of the 
proximal internal carotid arteries bilaterally. 3.  Atherosclerotic disease of the V4 segments of the vertebral arteries 
bilaterally, severe on the left and mild to moderate on the right. 4.  7 x 5 mm inferiorly projecting left posterior communicating artery origin 
aneurysm. STATUS DETERMINATION  On the basis of clinical data, available documentaion, we believe that the current status of this patient as INPATIENT is Appropriate Additional comments Insurance  [place]or: [place] MED[place]ARE / Plan: [place] MEDICARE PART A & B / Product Type: Medicare / Insurance Information [place] Phone:   
 SubscriberEric Francis Subscriber#: [id number] Group#:  Precert#:   
   
 [employer] [place] Phone:   
 Subscriber: Jo-Ann Francis Subscriber#: [id number] Group#:  Precert#:   
  
 
 
The information in this document is a recommendation to be used for utilization review and utilization management purposes only. This recommendation is not an order. The recommendation is made based on the information reviewed at the time of the referral, is pursuant to the [facility] Conditions of Participation (42 CFR Part 482), and is neither a judgment nor an assessment with regard to the appropriateness or quality of clinical care. For all Managed Care patients: The Criteria are intended solely for use as screening guidelines with respect to the medical appropriateness of healthcare services and not for final clinical or payment determinations concerning the type or level of medical care provided, or proposed to be provided, to a patient. They help the reviewers determine whether a patient is appropriate for observation or inpatient admission at the time a decision to admit the patient is being made. All efforts are made to apply the pertinent payor criteria (MCG or InterQual) as well as the clinical judgements based on the information reviewed at the time of the referral.\" Nothing in this document may be used to limit, alter, or affect clinical services provided to the patient named below. No current facility-administered medications on file prior to encounter. Current Outpatient Medications on File Prior to Encounter Medication Sig Dispense Refill  acetaminophen (TYLENOL) 325 mg tablet Take 650 mg by mouth every four (4) hours as needed for Pain. Indications: backache  irbesartan-hydroCHLOROthiazide (AVALIDE) 150-12.5 mg per tablet TAKE 1 TABLET BY MOUTH EVERY DAY 90 Tab 0  
  metoprolol tartrate (LOPRESSOR) 25 mg tablet TAKE 1 TABLET BY MOUTH EVERY MORNING AND 2 TABLETS BY MOUTH EVERY EVENING 270 Tab 1  
 levothyroxine (SYNTHROID) 88 mcg tablet Take 1 Tab by mouth Daily (before breakfast). Increased dose 90 Tab 1  
 atorvastatin (LIPITOR) 20 mg tablet Take 1 Tab by mouth nightly. Needs appointment 30 Tab 0  
 aspirin delayed-release (ASPIR-81) 81 mg tablet Take 81 mg by mouth daily. 3:06 PM 9/28/2019

## 2019-09-28 NOTE — PROGRESS NOTES
Problem: TIA/CVA Stroke: 0-24 hours  Goal: Activity/Safety  Outcome: Progressing Towards Goal  Goal: Consults, if ordered  Outcome: Progressing Towards Goal  Goal: Diagnostic Test/Procedures  Outcome: Progressing Towards Goal  Goal: Nutrition/Diet  Outcome: Progressing Towards Goal

## 2019-09-29 VITALS
BODY MASS INDEX: 19 KG/M2 | HEIGHT: 60 IN | TEMPERATURE: 98.5 F | DIASTOLIC BLOOD PRESSURE: 54 MMHG | WEIGHT: 96.78 LBS | RESPIRATION RATE: 29 BRPM | HEART RATE: 66 BPM | SYSTOLIC BLOOD PRESSURE: 133 MMHG | OXYGEN SATURATION: 100 %

## 2019-09-29 PROBLEM — I67.1 CEREBRAL ANEURYSM WITHOUT RUPTURE: Status: ACTIVE | Noted: 2019-09-29

## 2019-09-29 PROBLEM — G45.9 TIA (TRANSIENT ISCHEMIC ATTACK): Status: ACTIVE | Noted: 2019-09-28

## 2019-09-29 PROCEDURE — 74011250636 HC RX REV CODE- 250/636: Performed by: HOSPITALIST

## 2019-09-29 PROCEDURE — 90471 IMMUNIZATION ADMIN: CPT

## 2019-09-29 PROCEDURE — 97161 PT EVAL LOW COMPLEX 20 MIN: CPT

## 2019-09-29 PROCEDURE — 74011250637 HC RX REV CODE- 250/637: Performed by: HOSPITALIST

## 2019-09-29 PROCEDURE — 97116 GAIT TRAINING THERAPY: CPT

## 2019-09-29 PROCEDURE — 90686 IIV4 VACC NO PRSV 0.5 ML IM: CPT | Performed by: HOSPITALIST

## 2019-09-29 RX ORDER — ATORVASTATIN CALCIUM 40 MG/1
40 TABLET, FILM COATED ORAL
Qty: 30 TAB | Refills: 5 | Status: SHIPPED | OUTPATIENT
Start: 2019-09-29 | End: 2019-10-01 | Stop reason: SDUPTHER

## 2019-09-29 RX ADMIN — PANTOPRAZOLE SODIUM 40 MG: 40 TABLET, DELAYED RELEASE ORAL at 06:53

## 2019-09-29 RX ADMIN — INFLUENZA VIRUS VACCINE 0.5 ML: 15; 15; 15; 15 SUSPENSION INTRAMUSCULAR at 14:11

## 2019-09-29 RX ADMIN — METOPROLOL TARTRATE 25 MG: 25 TABLET ORAL at 08:44

## 2019-09-29 RX ADMIN — LOSARTAN POTASSIUM 50 MG: 50 TABLET, FILM COATED ORAL at 08:43

## 2019-09-29 RX ADMIN — HYDROCHLOROTHIAZIDE 12.5 MG: 25 TABLET ORAL at 08:43

## 2019-09-29 RX ADMIN — LEVOTHYROXINE SODIUM 88 MCG: 88 TABLET ORAL at 06:53

## 2019-09-29 NOTE — PROGRESS NOTES
Problem: Patient Education: Go to Patient Education Activity  Goal: Patient/Family Education  Outcome: Progressing Towards Goal     Problem: TIA/CVA Stroke: Day 2 Until Discharge  Goal: Activity/Safety  Outcome: Progressing Towards Goal  Goal: Diagnostic Test/Procedures  Outcome: Progressing Towards Goal  Goal: Nutrition/Diet  Outcome: Progressing Towards Goal  Goal: Discharge Planning  Outcome: Progressing Towards Goal  Goal: Medications  Outcome: Progressing Towards Goal  Goal: Respiratory  Outcome: Progressing Towards Goal  Goal: Treatments/Interventions/Procedures  Outcome: Progressing Towards Goal  Goal: Psychosocial  Outcome: Progressing Towards Goal  Goal: *Verbalizes anxiety and depression are reduced or absent  Outcome: Progressing Towards Goal  Goal: *Absence of aspiration  Outcome: Progressing Towards Goal  Goal: *Absence of deep venous thrombosis signs and symptoms(Stroke Metric)  Outcome: Progressing Towards Goal  Goal: *Optimal pain control at patient's stated goal  Outcome: Progressing Towards Goal  Goal: *Tolerating diet  Outcome: Progressing Towards Goal  Goal: *Ability to perform ADLs and demonstrates progressive mobility and function  Outcome: Progressing Towards Goal  Goal: *Stroke education continued(Stroke Metric)  Outcome: Progressing Towards Goal     Problem: Ischemic Stroke: Discharge Outcomes  Goal: *Verbalizes anxiety and depression are reduced or absent  Outcome: Progressing Towards Goal  Goal: *Verbalize understanding of risk factor modification(Stroke Metric)  Outcome: Progressing Towards Goal  Goal: *Hemodynamically stable  Outcome: Progressing Towards Goal  Goal: *Absence of aspiration pneumonia  Outcome: Progressing Towards Goal  Goal: *Aware of needed dietary changes  Outcome: Progressing Towards Goal  Goal: *Verbalize understanding of prescribed medications including anti-coagulants, anti-lipid, and/or anti-platelets(Stroke Metric)  Outcome: Progressing Towards Goal  Goal: *Tolerating diet  Outcome: Progressing Towards Goal  Goal: *Aware of follow-up diagnostics related to anticoagulants  Outcome: Progressing Towards Goal  Goal: *Ability to perform ADLs and demonstrates progressive mobility and function  Outcome: Progressing Towards Goal  Goal: *Absence of DVT(Stroke Metric)  Outcome: Progressing Towards Goal  Goal: *Absence of aspiration  Outcome: Progressing Towards Goal  Goal: *Optimal pain control at patient's stated goal  Outcome: Progressing Towards Goal  Goal: *Home safety concerns addressed  Outcome: Progressing Towards Goal  Goal: *Describes available resources and support systems  Outcome: Progressing Towards Goal  Goal: *Verbalizes understanding of activation of EMS(911) for stroke symptoms(Stroke Metric)  Outcome: Progressing Towards Goal  Goal: *Understands and describes signs and symptoms to report to providers(Stroke Metric)  Outcome: Progressing Towards Goal  Goal: *Neurolgocially stable (absence of additional neurological deficits)  Outcome: Progressing Towards Goal  Goal: *Verbalizes importance of follow-up with primary care physician(Stroke Metric)  Outcome: Progressing Towards Goal  Goal: *Smoking cessation discussed,if applicable(Stroke Metric)  Outcome: Progressing Towards Goal  Goal: *Depression screening completed(Stroke Metric)  Outcome: Progressing Towards Goal     Problem: Falls - Risk of  Goal: *Absence of Falls  Description  Document Charisse Fall Risk and appropriate interventions in the flowsheet.   Outcome: Progressing Towards Goal  Note:   Fall Risk Interventions:  Mobility Interventions: Communicate number of staff needed for ambulation/transfer, Mechanical lift, OT consult for ADLs, Patient to call before getting OOB, PT Consult for assist device competence, Utilize walker, cane, or other assistive device, Utilize gait belt for transfers/ambulation    Mentation Interventions: Adequate sleep, hydration, pain control, Bed/chair exit alarm, Door open when patient unattended, Eyeglasses and hearing aids, Toileting rounds, Update white board, Increase mobility, More frequent rounding, Reorient patient    Medication Interventions: Assess postural VS orthostatic hypotension, Bed/chair exit alarm, Patient to call before getting OOB, Teach patient to arise slowly, Utilize gait belt for transfers/ambulation    Elimination Interventions: Bed/chair exit alarm, Call light in reach, Patient to call for help with toileting needs, Stay With Me (per policy), Toilet paper/wipes in reach, Toileting schedule/hourly rounds    History of Falls Interventions: Bed/chair exit alarm, Investigate reason for fall, Utilize gait belt for transfer/ambulation, Assess for delayed presentation/identification of injury for 48 hrs (comment for end date), Vital signs minimum Q4HRs X 24 hrs (comment for end date), Door open when patient unattended, Consult care management for discharge planning         Problem: Patient Education: Go to Patient Education Activity  Goal: Patient/Family Education  Outcome: Progressing Towards Goal     Problem: Pain  Goal: *Control of Pain  Outcome: Progressing Towards Goal     Problem: Patient Education: Go to Patient Education Activity  Goal: Patient/Family Education  Outcome: Progressing Towards Goal     Problem: Pressure Injury - Risk of  Goal: *Prevention of pressure injury  Description  Document Fernando Scale and appropriate interventions in the flowsheet.   Outcome: Progressing Towards Goal  Note:   Pressure Injury Interventions:       Moisture Interventions: Absorbent underpads, Check for incontinence Q2 hours and as needed, Maintain skin hydration (lotion/cream), Limit adult briefs, Minimize layers, Moisture barrier, Offer toileting Q_hr    Activity Interventions: Increase time out of bed, Pressure redistribution bed/mattress(bed type), PT/OT evaluation    Mobility Interventions: HOB 30 degrees or less, Float heels, Pressure redistribution bed/mattress (bed type), Turn and reposition approx.  every two hours(pillow and wedges), PT/OT evaluation    Nutrition Interventions: Document food/fluid/supplement intake, Offer support with meals,snacks and hydration                     Problem: Patient Education: Go to Patient Education Activity  Goal: Patient/Family Education  Outcome: Progressing Towards Goal     Problem: Patient Education: Go to Patient Education Activity  Goal: Patient/Family Education  Outcome: Progressing Towards Goal

## 2019-09-29 NOTE — PROGRESS NOTES
Problem: TIA/CVA Stroke: Day 2 Until Discharge  Goal: Activity/Safety  Outcome: Progressing Towards Goal  Goal: Diagnostic Test/Procedures  Outcome: Progressing Towards Goal  Goal: Nutrition/Diet  Outcome: Progressing Towards Goal  Goal: Medications  Outcome: Progressing Towards Goal  Goal: Respiratory  Outcome: Progressing Towards Goal  Goal: Psychosocial  Outcome: Progressing Towards Goal

## 2019-09-29 NOTE — PROGRESS NOTES
Bedside shift change report given to Sonali Cameron (oncoming nurse) by Baldemar Brown RN (offgoing nurse). Report included the following information SBAR, Kardex, Intake/Output, MAR, Recent Results and Cardiac Rhythm NSR. I have read and agree with Guille Sun's documentation as her preceptor.

## 2019-09-29 NOTE — CONSULTS
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 I discussed endovascular treatment options and surveillance (no treatment), a reasonable option given her comorbidities (CHF et al), dementia and age. She and her family will followup as outpatient to review imaging and make decisions. The aneurysm has a low cumulative annual risk of rupture (1-2%) but the patient expressed understanding that I cannot guarantee that it will not rupture without treatment. SBP goal < 140, preferably < 120 if possible. Intracranial athero and bilateral carotid stenoses also noted. Office contact information provided. Patient instructed to call 911 for new diplopia, sudden onset severe headache or stroke symptoms. Thank you for the consult.       Messi Tineo [facility]  [phone]

## 2019-09-29 NOTE — PROGRESS NOTES
Stroke Education documented in Patient Education: YES  Core Measures Documented in [facility]: YES  Risk Factors: YES  Warning signs of stroke: YES  When to Activate 911: YES  Medication Education for Risk Factors: YES  Smoking cessation if applicable: N/A  Written Education Given:  YES    Discharge NIH Completed: YES  Score: 2    BRAINS: YES    Follow Up Appointment Made: NO  Date/Time if applicable: N/A       I have reviewed discharge instructions with the Patient and Family. The patient and family verbalized understanding. PIV and telemetry discontinued. Patient discharged to home via family with belongings.

## 2019-09-29 NOTE — PROGRESS NOTES
Hospitalist Progress Note  Maida Crain MD  Answering service: [phone] OR [phone] from in house phone        NAME:  Jean Pierre Whittington  :  1935  MRN:  [id number]      Admission Summary:   Jean Pierre Whittington is a 82 y.o. female who presents with syncope and left facial droop. She has a past medical history anemia, carotid artery disease, dehydration, dementia, goiter status post thyroidectomy, lumbar disc disease L4-L5 status post epidural jocelin/loss, osteoporosis high cholesterol and hypertension. Per daughter, patient was sitting and she eating  some corn flakes this morning for breakfast then fell backward possible hit her head.  She was found in the floor by her nephew and states she was unable to get out of the floor because she felt too weak.  She lives with 1 daughter/presents with a second daughter after being found in the floor today for unknown period of time. In short pump ER. Pt's family report  L. Side facial droop with slurred speech which is new. Tele neuro consulted. Not TPA candidate. Interval history / Subjective:   Patient without complaints today  Family at the bedside     Assessment & Plan:     1. Syncope with transient facial droop: resolved  Suspected TIA  MRI of brain neg for acute CVA  CVA  of neck and head : marked atherosclerotic disease at the origin of the bilateral internal  carotid arteries there is atherosclerotic plaque at the origin of the bilateral  vertebral arteries. 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 Cont aspirin, consult neurointerventional in am  2. HTN: continue home meds. negative orthostatics   3. HLP: continue lipitor   4. Microcytic anemia: may have chronic iron deficiency. Will check iron panel. Ferritin. B12, folate. Stool ob   5.  Carotid stenosis- follow up ultrasound in 6 months  Code status: Full  DVT prophylaxis: scds    Care Plan discussed with: Patient/Family  Disposition: Home w/Family and TBD     Hospital Problems  Date Reviewed: 10/23/2018          Codes Class Noted POA    Acute CVA (cerebrovascular accident) [facility]) ICD-10-CM: I63.9  ICD-9-CM: 434.91  9/28/2019 Unknown        Syncope ICD-10-CM: R55  ICD-9-CM: 780.2  9/27/2019 Unknown                Review of Systems:   A comprehensive review of systems was negative except for that written in the HPI. Vital Signs:    Last 24hrs VS reviewed since prior progress note. Most recent are:  Visit Vitals  /64   Pulse 70   Temp 97.9 °F (36.6 °C)   Resp 12   Ht 5' (1.524 m)   Wt 43.7 kg (96 lb 5.5 oz)   SpO2 100%   BMI 18.82 kg/m²       No intake or output data in the 24 hours ending 09/29/19 0045     Physical Examination:             Constitutional:  No acute distress, cooperative, pleasant    ENT:  Oral mucous moist, oropharynx benign. Resp:  CTA bilaterally. No wheezing/rhonchi/rales. No accessory muscle use   CV:  Regular rhythm, normal rate, no murmurs, gallops, rubs    GI:  Soft, non distended, non tender. normoactive bowel sounds, no hepatosplenomegaly     Musculoskeletal:  No edema, warm, 2+ pulses throughout    Neurologic:  Moves all extremities. AAOx3, CN II-XII reviewed     Psych:  flat affect       Data Review:    Review and/or order of tests in the radiology section of CPT  Review and/or order of tests in the medicine section of CPT      Labs:     Recent Labs     09/27/19  1759   WBC 3.3*   HGB 8.0*   HCT 25.7*        Recent Labs     09/27/19  1759   *   K 3.4*   CL 97   CO2 28   BUN 19   CREA 1.36*      CA 8.9     Recent Labs     09/27/19  1759   SGOT 19   ALT 13   AP 92   TBILI 0.2   TP 7.6   ALB 3.0*   GLOB 4.6*     No results for input(s): INR, PTP, APTT, INREXT in the last 72 hours.    Recent Labs     09/28/19  0544   TIBC 200*   PSAT 36   FERR 941*      Lab Results   Component Value Date/Time    Folate 43.7 (H) 04/23/2013 09:33 AM      No results for input(s): PH, PCO2, PO2 in the last 72 hours.   Recent Labs     09/27/19  1759   CPK 91   CKNDX Cannot be calculated   TROIQ <0.05     Lab Results   Component Value Date/Time    Cholesterol, total 232 (H) 09/28/2019 05:44 AM    HDL Cholesterol 50 09/28/2019 05:44 AM    LDL, calculated 165 (H) 09/28/2019 05:44 AM    Triglyceride 85 09/28/2019 05:44 AM    CHOL/HDL Ratio 4.6 09/28/2019 05:44 AM     Lab Results   Component Value Date/Time    Glucose (POC) 98 09/27/2019 08:11 PM     Lab Results   Component Value Date/Time    Color YELLOW/STRAW 09/27/2019 05:59 PM    Appearance CLEAR 09/27/2019 05:59 PM    Specific gravity 1.010 09/27/2019 05:59 PM    Specific gravity 1.015 03/05/2010 11:55 AM    pH (UA) 7.0 09/27/2019 05:59 PM    Protein NEGATIVE  09/27/2019 05:59 PM    Glucose NEGATIVE  09/27/2019 05:59 PM    Ketone NEGATIVE  09/27/2019 05:59 PM    Bilirubin NEGATIVE  09/27/2019 05:59 PM    Urobilinogen 0.2 09/27/2019 05:59 PM    Nitrites NEGATIVE  09/27/2019 05:59 PM    Leukocyte Esterase TRACE (A) 09/27/2019 05:59 PM    Epithelial cells FEW 09/27/2019 05:59 PM    Bacteria NEGATIVE  09/27/2019 05:59 PM    WBC 0-4 09/27/2019 05:59 PM    RBC 0-5 09/27/2019 05:59 PM         Medications Reviewed:     Current Facility-Administered Medications   Medication Dose Route Frequency    influenza vaccine 2019-20 (6 mos+)(PF) (FLUARIX/FLULAVAL/FLUZONE QUAD) injection 0.5 mL  0.5 mL IntraMUSCular PRIOR TO DISCHARGE    acetaminophen (TYLENOL) tablet 650 mg  650 mg Oral Q4H PRN    Or    acetaminophen (TYLENOL) solution 650 mg  650 mg Per NG tube Q4H PRN    Or    acetaminophen (TYLENOL) suppository 650 mg  650 mg Rectal Q4H PRN    aspirin tablet 325 mg  325 mg Oral DAILY    labetalol (NORMODYNE;TRANDATE) injection 5 mg  5 mg IntraVENous Q10MIN PRN    docusate sodium (COLACE) capsule 100 mg  100 mg Oral BID    ondansetron (ZOFRAN) injection 4 mg  4 mg IntraVENous Q6H PRN    atorvastatin (LIPITOR) tablet 20 mg  20 mg Oral QHS    levothyroxine (SYNTHROID) tablet 88 mcg 88 mcg Oral ACB    metoprolol tartrate (LOPRESSOR) tablet 25 mg  25 mg Oral DAILY    metoprolol tartrate (LOPRESSOR) tablet 50 mg  50 mg Oral QHS    enoxaparin (LOVENOX) injection 30 mg  30 mg SubCUTAneous Q24H    pantoprazole (PROTONIX) tablet 40 mg  40 mg Oral ACB    losartan (COZAAR) tablet 50 mg  50 mg Oral DAILY    And    hydroCHLOROthiazide (HYDRODIURIL) tablet 12.5 mg  12.5 mg Oral DAILY     ______________________________________________________________________  EXPECTED LENGTH OF STAY: - - -  ACTUAL LENGTH OF STAY:          2                 Isaias Milligan MD

## 2019-09-29 NOTE — DISCHARGE SUMMARY
Discharge Summary       PATIENT ID: Angela Oh  MRN: [id number]   YOB: 1935    DATE OF ADMISSION: 9/27/2019  5:40 PM    DATE OF DISCHARGE: 9/27/19 1:15pm  PRIMARY CARE PROVIDER: Angelika Pearson MD     ATTENDING PHYSICIAN: Thelma Sanders  DISCHARGING PROVIDER: Giuliano Zavala MD    To contact this individual call [phone] and ask the  to page. If unavailable ask to be transferred the Adult Hospitalist Department. CONSULTATIONS: IP CONSULT TO NEUROLOGY  IP CONSULT TO NEUROINTERVENTIONAL SURGERY    PROCEDURES/SURGERIES: * No surgery found *    ADMITTING DIAGNOSES & HOSPITAL COURSE:   Francia Russo a 82 y. o. female who presents with syncope and left facial droop. She has a past medical history anemia, carotid artery disease, dehydration, dementia, goiter status post thyroidectomy, lumbar disc disease L4-L5 status post epidural jocelin/loss, osteoporosis high cholesterol and hypertension. Per daughter, patient was sitting and she eating  some corn flakes this morning for breakfast then fell backward possible hit her head.  She was found in the floor by her nephew and states she was unable to get out of the floor because she felt too weak.  She lives with 1 daughter/presents with a second daughter after being found in the floor today for unknown period of time. In short pump ER. Pt's family report  L. Side facial droop with slurred speech which is new. Tele neuro consulted. Not TPA candidate. DISCHARGE DIAGNOSES / PLAN:      1. Syncope with transient facial droop: resolved  Suspected TIA  MRI of brain neg for acute CVA  CVA  of neck and head : marked atherosclerotic disease at the origin of the bilateral internal  carotid arteries there is atherosclerotic plaque at the origin of the bilateral  vertebral arteries. 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 Cont aspirin, consult neurointerventional in am  2. HTN: continue home meds. negative orthostatics   3.  HLP: continue lipitor - increased dose due to elevated LDL  4. Anemia of chronic dz- : normal iron and B12  levels  5. Carotid stenosis- follow up ultrasound in 6 months     ADDITIONAL CARE RECOMMENDATIONS:   Patient instructed to call 911 for new diplopia, sudden onset severe headache or stroke symptoms. Continue 81mg aspirin daily for suspected TIA    Increased dose of cholesterol med- atorvastatin 40mg daily on discharge    Resume low fat, low cholesterol diet- no extra salt    Follow up carotid artery ultrasound in 1 year    Follow up with neurointerventional team - Dr Carmelita Thapa- call for appt after DC      PENDING TEST RESULTS:   At the time of discharge the following test results are still pending: folate levels    FOLLOW UP APPOINTMENTS:    Follow-up Information     Follow up With Specialties Details Why Contact Info    Harish Gomez MD Endovascular Surgical Neuroradiology Call for treatment recs for cerebral aneurysm [address]  [address] [facility]  [phone]               DIET: Heart healthy diet    ACTIVITY: Activity as tolerated    WOUND CARE: NA    EQUIPMENT needed: none        DISCHARGE MEDICATIONS:  Current Discharge Medication List      CONTINUE these medications which have CHANGED    Details   atorvastatin (LIPITOR) 40 mg tablet Take 1 Tab by mouth nightly. Qty: 30 Tab, Refills: 5         CONTINUE these medications which have NOT CHANGED    Details   acetaminophen (TYLENOL) 325 mg tablet Take 650 mg by mouth every four (4) hours as needed for Pain. Indications: backache      irbesartan-hydroCHLOROthiazide (AVALIDE) 150-12.5 mg per tablet TAKE 1 TABLET BY MOUTH EVERY DAY  Qty: 90 Tab, Refills: 0    Associated Diagnoses: Hypertension, uncontrolled      metoprolol tartrate (LOPRESSOR) 25 mg tablet TAKE 1 TABLET BY MOUTH EVERY MORNING AND 2 TABLETS BY MOUTH EVERY EVENING  Qty: 270 Tab, Refills: 1      levothyroxine (SYNTHROID) 88 mcg tablet Take 1 Tab by mouth Daily (before breakfast).  Increased dose  Qty: 90 Tab, Refills: 1      aspirin delayed-release (ASPIR-81) 81 mg tablet Take 81 mg by mouth daily. NOTIFY YOUR PHYSICIAN FOR ANY OF THE FOLLOWING:   Fever over 101 degrees for 24 hours. Chest pain, shortness of breath, fever, chills, nausea, vomiting, diarrhea, change in mentation, falling, weakness, bleeding. Severe pain or pain not relieved by medications. Or, any other signs or symptoms that you may have questions about. DISPOSITION:    Home With:   OT  PT  HH  RN       Long term SNF/Inpatient Rehab   X Independent/    Hospice    Other:       PATIENT CONDITION AT DISCHARGE:     Functional status    Poor     Deconditioned    X Independent      Cognition     Lucid     Forgetful    X Dementia      Catheters/lines (plus indication)    Kang     PICC     PEG    X None      Code status   X  Full code     DNR      PHYSICAL EXAMINATION AT DISCHARGE:    Patient Vitals for the past 24 hrs:   Temp Pulse Resp BP SpO2   09/29/19 1005 98.4 °F (36.9 °C) 77 23 136/67 98 %   09/29/19 0843  62  141/53    09/29/19 0553 98 °F (36.7 °C) (!) 58 18 126/47 100 %   09/29/19 0214 98.2 °F (36.8 °C) (!) 56 23 129/58 100 %   09/28/19 2150 97.9 °F (36.6 °C) 70 12 149/64 100 %   09/28/19 2141  71  149/64    09/28/19 1759 98.5 °F (36.9 °C) 61 24 139/58 99 %   09/28/19 1532    159/56    09/28/19 1348 98.3 °F (36.8 °C) 65 18 159/65 100 %     General:          Alert, cooperative, no distress, appears stated age. HEENT:           Atraumatic, anicteric sclerae, pink conjunctivae                          No oral ulcers, mucosa moist, throat clear, dentition fair  Neck:               Supple, symmetrical  Lungs:             Clear to auscultation bilaterally. No Wheezing or Rhonchi. No rales. Chest wall:      No tenderness  No Accessory muscle use. Heart:              Regular  rhythm,  No  murmur   No edema  Abdomen:        Soft, non-tender. Not distended. Bowel sounds normal  Extremities:     No cyanosis. No clubbing,                            Skin turgor normal, Capillary refill normal  Skin:                Not pale.   Not Jaundiced  No rashes   Psych:             Demented, flat affect- no agitation  Neurologic:      Alert, moves all extremities, answers questions appropriately and responds to commands     Recent Results (from the past 24 hour(s))   ECHO ADULT COMPLETE    Collection Time: 09/28/19  3:54 PM   Result Value Ref Range    LA Volume 132.45 22 - 52 mL    LV E' Lateral Velocity 6.82 cm/s    LV E' Septal Velocity 6.55 cm/s    Ao Root D 3.33 cm    Aortic Valve Systolic Peak Velocity 744.25 cm/s    AoV PG 7.7 mmHg    LVIDd 3.30 (A) 3.9 - 5.3 cm    LVPWd 0.86 0.6 - 0.9 cm    LVIDs 2.33 cm    IVSd 0.86 0.6 - 0.9 cm    MVA (PHT) 3.4 cm2    MV A Tra 79.26 cm/s    MV E Tra 81.94 cm/s    MV E/A 1.03     Left Atrium to Aortic Root Ratio 1.53     LA Vol 4C 128.06 (A) 22 - 52 mL    LA Vol 2C 120.35 (A) 22 - 52 mL    LA Area 4C 32.0 cm2    LV Mass AL 80.0 67 - 162 g    LV Mass AL Index 58.5 43 - 95 g/m2    E/E' lateral 12.01     E/E' septal 12.51     E/E' ratio (averaged) 12.26     Mitral Valve E Wave Deceleration Time 221.4 ms    Mitral Valve Pressure Half-time 64.2 ms    Left Atrium Major Axis 5.08 cm    Triscuspid Valve Regurgitation Peak Gradient 33.6 mmHg    Aortic Regurgitant Pressure Half-time 581.5 cm    Pulmonic Valve Max Velocity 43.15 cm/s    TR Max Velocity 289.96 cm/s    PISA MR Rad 0.56 cm    LA Vol Index 96.79 16 - 28 ml/m2    LA Vol Index 87.95 16 - 28 ml/m2    LA Vol Index 93.58 16 - 28 ml/m2    AR Max Tra 303.72 cm/s    PV peak gradient 0.7 mmHg    PASP 45.0 mmHg         CHRONIC MEDICAL DIAGNOSES:  Problem List as of 9/29/2019 Date Reviewed: 9/29/2019          Codes Class Noted - Resolved    Cerebral aneurysm without rupture ICD-10-CM: I67.1  ICD-9-CM: 437.3  9/29/2019 - Present        TIA (transient ischemic attack) ICD-10-CM: G45.9  ICD-9-CM: 435.9  9/28/2019 - Present        Syncope ICD-10-CM: R55  ICD-9-CM: 780.2  9/27/2019 - Present        Age-related osteoporosis without current pathological fracture ICD-10-CM: M81.0  ICD-9-CM: 733.01  8/1/2017 - Present        Alzheimer's dementia ICD-10-CM: G30.9, F02.80  ICD-9-CM: 331.0  7/5/2017 - Present        Carotid stenosis, bilateral ICD-10-CM: I65.23  ICD-9-CM: 433.10, 433.30  7/5/2017 - Present        Glucose intolerance (impaired glucose tolerance) ICD-10-CM: R73.02  ICD-9-CM: 790.22  4/10/2015 - Present        Hypothyroidism ICD-10-CM: E03.9  ICD-9-CM: 244.9  1/31/2013 - Present        Anemia, chronic disease ICD-10-CM: D63.8  ICD-9-CM: 285.29  1/31/2013 - Present        Chronic renal insufficiency, stage III (moderate) (HCC) ICD-10-CM: N18.3  ICD-9-CM: 585.3  1/31/2013 - Present        Hypertension, uncontrolled ICD-10-CM: I10  ICD-9-CM: 401.9  1/31/2013 - Present        Kyphosis (acquired) (postural) ICD-10-CM: M40.00  ICD-9-CM: 737.10  1/31/2013 - Present              Greater than 30 minutes were spent with the patient on counseling and coordination of care    Signed:   Kallie Mason MD  9/29/2019  1:18 PM

## 2019-09-29 NOTE — PROGRESS NOTES
PHYSICAL THERAPY EVALUATION/DISCHARGE  Patient: Elmira Alexander (22 y.o. female)  Date: 9/29/2019  Primary Diagnosis: Syncope [R55]  Acute CVA (cerebrovascular accident) [facility]) [I63.9]       Precautions: fall         ASSESSMENT  Based on the objective data described below, the patient presents with baseline/ near baseline LOF with mobility. Pt tolerated orthostatic check (negative) and ambulation on unit x 2 trials; demo increased stability when wearing shoes as she does at home. Pt appropriate to amb as tolerated with shoes donned and supervision (due to lines, baseline cognitive deficits). No further PT needs at this time, pt/ family in agreement with d/c PT services. Functional Outcome Measure: The patient scored 45/56 on the Hebert Balance outcome measure which is indicative of low risk for fall. Other factors to consider for discharge: pt alone at times during day     Further skilled acute physical therapy is not indicated at this time. PLAN :  Recommendation for discharge: (in order for the patient to meet his/her long term goals)  No skilled physical therapy/ follow up rehabilitation needs identified at this time. This discharge recommendation:  Has not yet been discussed the attending provider and/or case management    Equipment recommendations for successful discharge: none       SUBJECTIVE:   Patient stated I wear my shoes until I go to bed.     OBJECTIVE DATA SUMMARY:   HISTORY:    Past Medical History:   Diagnosis Date    Anemia of chronic disease 11/03    [place]-hem    Carotid artery disease ([facility] [address]     repeat dopplers 3/14    Dehydration     Dementia     Goiter     s/p thyroidectomy, benign    Lumbar disc disease     ruptured disc L4-L5 s/p epidural  [place]/os    Osteoporosis, unspecified 12/03    Pure hypercholesterolemia 11/03    Unspecified essential hypertension      Past Surgical History:   Procedure Laterality Date    HX COLONOSCOPY      PUCT/ASPIR BREAST CYST,EACH ADDN     right breast    THYROIDECTOMY  5/10    benign goiter  Brown/surg       Prior level of function: indep household amb without device  Personal factors and/or comorbidities impacting plan of care: h/o dementia    Home Situation  Home Environment: Private residence  # Steps to Enter: 3  Rails to Enter: Yes  Hand Rails : Bilateral  One/Two Story Residence: One story  Living Alone: No  Support Systems: Family member(s)(daughter and grandson)  Patient Expects to be Discharged to[de-identified] Private residence  Current DME Used/Available at Home: None    EXAMINATION/PRESENTATION/DECISION MAKING:   Critical Behavior:  Neurologic State: Alert, Confused, Eyes open spontaneously  Orientation Level: Oriented to person, Oriented to place  Cognition: Follows commands, Memory loss, Recognition of people/places     Hearing: Auditory  Auditory Impairment: None    Range Of Motion:  AROM: Within functional limits                       Strength:    Strength: Within functional limits                    Tone & Sensation:   Tone: Normal              Sensation: Intact               Coordination:  Coordination: Within functional limits  Vision:      Functional Mobility:  Bed Mobility:     Supine to Sit: Independent     Scooting: Modified independent(for scoot to EOB)  Transfers:  Sit to Stand: Supervision  Stand to Sit: Modified independent                       Balance:   Sitting: Intact  Standing: Intact; With support  Standing - Static: Good  Standing - Dynamic : Good(intermittent support)  Ambulation/Gait Training:  Distance (ft): 200 Feet (ft)(200' x 2)  Assistive Device: (none)  Ambulation - Level of Assistance: Supervision        Gait Abnormalities: Path deviations(mild)              Speed/Carmela: Pace decreased (<100 feet/min)    Functional Measure:  Hebert Balance Test:    Sitting to Standin  Standing Unsupported: 4  Sitting with Back Unsupported: 4  Standing to Sitting: 3  Transfers: 4  Standing Unsupported with Eyes Closed: 4  Standing Unsupported with Feet Together: 3  Reach Forward with Outstretched Arm: 4   Object: 3  Turn to Look Over Shoulders: 4  Turn 360 Degrees: 4  Alternate Foot on Step/Stool: 2  Standing Unsupported One Foot in Front: 1  Stand on One Le  Total: 45/56         56=Maximum possible score;   0-20=High fall risk  21-40=Moderate fall risk   41-56=Low fall risk                  Physical Therapy Evaluation Charge Determination   History Examination Presentation Decision-Making   MEDIUM  Complexity : 1-2 comorbidities / personal factors will impact the outcome/ POC  LOW Complexity : 1-2 Standardized tests and measures addressing body structure, function, activity limitation and / or participation in recreation  LOW Complexity : Stable, uncomplicated  LOW Complexity : FOTO score of       Based on the above components, the patient evaluation is determined to be of the following complexity level: LOW     Pain Rating:  Pt denies c/o pain    Activity Tolerance:   Good  Please refer to the flowsheet for vital signs taken during this treatment. After treatment patient left in no apparent distress:   Sitting in chair, Call bell within reach, Bed / chair alarm activated and Caregiver / family present    COMMUNICATION/EDUCATION:   The patients plan of care was discussed with: Registered Nurse. Fall prevention education was provided and the patient/caregiver indicated understanding., Patient/family have participated as able in goal setting and plan of care. and Patient/family agree to work toward stated goals and plan of care.     Thank you for this referral.  Kateryna Ceballos, PT   Time Calculation: 30 mins

## 2019-09-30 ENCOUNTER — PATIENT OUTREACH (OUTPATIENT)
Dept: INTERNAL MEDICINE CLINIC | Age: 84
End: 2019-09-30

## 2019-09-30 LAB
FOLATE BLD-MCNC: 473.3 NG/ML
FOLATE RBC-MCNC: 1856 NG/ML
HCT VFR BLD AUTO: 25.5 % (ref 34–46.6)
LEFT CCA DIST DIAS: 14.7 CM/S
LEFT CCA DIST SYS: 54 CM/S
LEFT CCA PROX DIAS: 9.5 CM/S
LEFT CCA PROX SYS: 50.5 CM/S
LEFT ECA DIAS: 0 CM/S
LEFT ECA SYS: 259.9 CM/S
LEFT ICA DIST DIAS: 22.4 CM/S
LEFT ICA DIST SYS: 119.6 CM/S
LEFT ICA MID DIAS: 30 CM/S
LEFT ICA MID SYS: 120.6 CM/S
LEFT ICA PROX DIAS: 39.3 CM/S
LEFT ICA PROX SYS: 134.5 CM/S
LEFT ICA/CCA SYS: 2.49
LEFT VERTEBRAL DIAS: 8.69 CM/S
LEFT VERTEBRAL SYS: 67.6 CM/S
RIGHT CCA DIST DIAS: 11.8 CM/S
RIGHT CCA DIST SYS: 68.7 CM/S
RIGHT CCA PROX DIAS: 6.5 CM/S
RIGHT CCA PROX SYS: 58.1 CM/S
RIGHT ECA DIAS: 0 CM/S
RIGHT ECA SYS: 219.5 CM/S
RIGHT ICA DIST DIAS: 20.6 CM/S
RIGHT ICA DIST SYS: 69.9 CM/S
RIGHT ICA MID DIAS: 33.3 CM/S
RIGHT ICA MID SYS: 116.1 CM/S
RIGHT ICA PROX DIAS: 41.6 CM/S
RIGHT ICA PROX SYS: 190.1 CM/S
RIGHT ICA/CCA SYS: 2.8
RIGHT VERTEBRAL DIAS: 15.61 CM/S
RIGHT VERTEBRAL SYS: 65.3 CM/S

## 2019-09-30 NOTE — PROGRESS NOTES
Hospital Discharge Follow-Up      Date/Time:  10/1/2019 3:55 PM    Patient was admitted to [facility] on 2019 and discharged on 2019 for Syncope. The physician discharge summary was available at the time of outreach. Patient was contacted within 1 business days of discharge. Top Challenges reviewed with the provider   Anemia. Hct 25.5 on 2019  See abnormal recent labs. Carotid Stenosis - follow up CT in 6 months  Needs occult blood of stool. Method of communication with provider :staff message    Inpatient RRAT score: 34  Was this a readmission? No    Nurse Navigator (NN) contacted the family by telephone to perform post hospital discharge assessment. Verified name and  with family as identifiers. Provided introduction to self, and explanation of the Nurse Navigator role. Reviewed discharge instructions and red flags with family who verbalized understanding. Family given an opportunity to ask questions and does not have any further questions or concerns at this time. The family agrees to contact the PCP office for questions related to their healthcare. NN provided contact information for future reference. Disease Specific:   N/A    Summary of patient's top problems:  1. CV - Syncope with transient facial droop has resolved. Suspected TIA. MRI neg for CVA but marked atherosclerotic disease at the origin of the bilateral internal  carotid arteries there is atherosclerotic plaque at the origin of the bilateral  vertebral arteries. 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 On ASA and increased atorvastatin. Needs follow up ultrasound for carotid stenosis in 6 months. Resume low fat, low cholesterol diet- no extra salt  2. Anemia - Continues. Chronic diagnosis. Home Health orders at discharge:None    Barriers to care? one noted.   Advance Care Planning:   Does patient have an Advance Directive:  not on file     Medication(s):   New Medications at Discharge: None  Changed Medications at Discharge:  increased atorvastatin 40mg daily on discharge  Discontinued Medications at Discharge: None    Medication reconciliation was performed with family, who verbalizes understanding of administration of home medications. There were no barriers to obtaining medications identified at this time. Referral to Pharm D needed: no     Current Outpatient Medications   Medication Sig    atorvastatin (LIPITOR) 40 mg tablet Take 1 Tab by mouth nightly.  acetaminophen (TYLENOL) 325 mg tablet Take 650 mg by mouth every four (4) hours as needed for Pain. Indications: backache    irbesartan-hydroCHLOROthiazide (AVALIDE) 150-12.5 mg per tablet TAKE 1 TABLET BY MOUTH EVERY DAY    metoprolol tartrate (LOPRESSOR) 25 mg tablet TAKE 1 TABLET BY MOUTH EVERY MORNING AND 2 TABLETS BY MOUTH EVERY EVENING    levothyroxine (SYNTHROID) 88 mcg tablet Take 1 Tab by mouth Daily (before breakfast). Increased dose    aspirin delayed-release (ASPIR-81) 81 mg tablet Take 81 mg by mouth daily. No current facility-administered medications for this visit. There are no discontinued medications. [facility] follow up appointment(s): Daughter declined sooner appointment with PCP. Future Appointments   Date Time Provider Cory Vazquez   10/15/2019 10:15 AM [facility]   10/23/2019 12:00 AM [facility] [place] [facility]      Non-[facility] follow up appointment(s): None noted at this time. Daughter will schedule appointment with Dr. Ainsley Lott as directed. [facility]:  n/a       Goals        Post Hospitalization     Understands red flags post discharge. 10/1/2019 Daughter instructed to call 911 if patient develops new diplopia, sudden onset severe headache or stroke symptoms, reviewed discharge instructions. Daughter very knowledgeable and involved in patients care. Daughter reports patient is doing well, still a little weak. She is eating and drinking normally. Daughter reports she has no c/o and will attend PCP appointment.   PAC

## 2019-10-01 RX ORDER — ATORVASTATIN CALCIUM 40 MG/1
40 TABLET, FILM COATED ORAL
Qty: 30 TAB | Refills: 5 | Status: SHIPPED | OUTPATIENT
Start: 2019-10-01 | End: 2019-10-15 | Stop reason: SDUPTHER

## 2019-10-08 ENCOUNTER — PATIENT OUTREACH (OUTPATIENT)
Dept: INTERNAL MEDICINE CLINIC | Age: 84
End: 2019-10-08

## 2019-10-08 NOTE — PROGRESS NOTES
Goals        Post Hospitalization     Understands red flags post discharge. 10/8/2019 Daughter reports patient is doing well and and is back to herself. She has had no further stroke symptoms. [facility]    10/1/2019 Daughter instructed to call 911 if patient develops new diplopia, sudden onset severe headache or stroke symptoms, reviewed discharge instructions. Daughter very knowledgeable and involved in patients care. Daughter reports patient is doing well, still a little weak. She is eating and drinking normally. Daughter reports she has no c/o and will attend PCP appointment.   PAC

## 2019-10-10 ENCOUNTER — HOSPITAL ENCOUNTER (EMERGENCY)
Age: 84
Discharge: HOME | End: 2019-10-10
Attending: EMERGENCY MEDICINE
Payer: MEDICARE

## 2019-10-10 ENCOUNTER — APPOINTMENT (OUTPATIENT)
Dept: VASCULAR SURGERY | Age: 84
End: 2019-10-10
Attending: EMERGENCY MEDICINE
Payer: MEDICARE

## 2019-10-10 ENCOUNTER — APPOINTMENT (OUTPATIENT)
Dept: GENERAL RADIOLOGY | Age: 84
End: 2019-10-10
Attending: EMERGENCY MEDICINE
Payer: MEDICARE

## 2019-10-10 VITALS
OXYGEN SATURATION: 98 % | DIASTOLIC BLOOD PRESSURE: 74 MMHG | HEART RATE: 77 BPM | RESPIRATION RATE: 16 BRPM | TEMPERATURE: 98 F | SYSTOLIC BLOOD PRESSURE: 160 MMHG

## 2019-10-10 DIAGNOSIS — M79.604 RIGHT LEG PAIN: Primary | ICD-10-CM

## 2019-10-10 PROCEDURE — 74011250637 HC RX REV CODE- 250/637: Performed by: EMERGENCY MEDICINE

## 2019-10-10 PROCEDURE — 93971 EXTREMITY STUDY: CPT

## 2019-10-10 PROCEDURE — 99283 EMERGENCY DEPT VISIT LOW MDM: CPT

## 2019-10-10 PROCEDURE — 73590 X-RAY EXAM OF LOWER LEG: CPT

## 2019-10-10 RX ORDER — ACETAMINOPHEN 325 MG/1
650 TABLET ORAL
Status: COMPLETED | OUTPATIENT
Start: 2019-10-10 | End: 2019-10-10

## 2019-10-10 RX ORDER — ACETAMINOPHEN 325 MG/1
650 TABLET ORAL
Qty: 20 TAB | Refills: 0 | Status: SHIPPED | OUTPATIENT
Start: 2019-10-10

## 2019-10-10 RX ADMIN — ACETAMINOPHEN 650 MG: 325 TABLET ORAL at 08:43

## 2019-10-10 NOTE — ED TRIAGE NOTES
Daughter stated she has right lower leg pain starting this am, pt with dementia and is unable to answer questions , no swelling noted, no redness noted, no injury per family

## 2019-10-10 NOTE — DISCHARGE INSTRUCTIONS
Patient Education        Leg Pain: Care Instructions  Your Care Instructions  Many things can cause leg pain. Too much exercise or overuse can cause a muscle cramp (or charley horse). You can get leg cramps from not eating a balanced diet that has enough potassium, calcium, and other minerals. If you do not drink enough fluids or are taking certain medicines, you may develop leg cramps. Other causes of leg pain include injuries, blood flow problems, nerve damage, and twisted and enlarged veins (varicose veins). You can usually ease pain with self-care. Your doctor may recommend that you rest your leg and keep it elevated. Follow-up care is a key part of your treatment and safety. Be sure to make and go to all appointments, and call your doctor if you are having problems. It's also a good idea to know your test results and keep a list of the medicines you take. How can you care for yourself at home? · Take pain medicines exactly as directed. ? If the doctor gave you a prescription medicine for pain, take it as prescribed. ? If you are not taking a prescription pain medicine, ask your doctor if you can take an over-the-counter medicine. · Take any other medicines exactly as prescribed. Call your doctor if you think you are having a problem with your medicine. · Rest your leg while you have pain, and avoid standing for long periods of time. · Prop up your leg at or above the level of your heart when possible. · Make sure you are eating a balanced diet that is rich in calcium, potassium, and magnesium, especially if you are pregnant. · If directed by your doctor, put ice or a cold pack on the area for 10 to 20 minutes at a time. Put a thin cloth between the ice and your skin. · Your leg may be in a splint, a brace, or an elastic bandage, and you may have crutches to help you walk. Follow your doctor's directions about how long to wear supports and how to use the crutches.   When should you call for help?  Call 911 anytime you think you may need emergency care. For example, call if:    · You have sudden chest pain and shortness of breath, or you cough up blood.     · Your leg is cool or pale or changes color.    Call your doctor now or seek immediate medical care if:    · You have increasing or severe pain.     · Your leg suddenly feels weak and you cannot move it.     · You have signs of a blood clot, such as:  ? Pain in your calf, back of the knee, thigh, or groin. ? Redness and swelling in your leg or groin.     · You have signs of infection, such as:  ? Increased pain, swelling, warmth, or redness. ? Red streaks leading from the sore area. ? Pus draining from a place on your leg. ? A fever.     · You cannot bear weight on your leg.    Watch closely for changes in your health, and be sure to contact your doctor if:    · You do not get better as expected. Where can you learn more? Go to http://rocael-chyna.info/. Enter N543 in the search box to learn more about \"Leg Pain: Care Instructions. \"  Current as of: June 26, 2019  Content Version: 12.2  © 7894-5501 [employer]. Care instructions adapted under license by [employer] (which disclaims liability or warranty for this information). If you have questions about a medical condition or this instruction, always ask your healthcare professional. [place] [address] any warranty or liability for your use of this information.

## 2019-10-10 NOTE — ED NOTES
Pt left via wheelchair out of ED with discharge instructions and prescriptions in hand given by Dr. Barby Corral; pt verbalized understanding of discharge paperwork and time allotted for questions. VSS. Pt alert and left with family member.

## 2019-10-10 NOTE — ED PROVIDER NOTES
82 y.o. female with past medical history significant for HTN, anemia, hypercholesterolemia, osteoporosis, goiter, CAD, lumbar disc disease, dehydration, dementia, and TIA who presents via private vehicle with chief complaint of leg pain. Pt c/o of pain in her R lower tib/fib area that is new today. She woke this morning and was unable to get out of bed by herself. When she was assisted out of bed she favored the R leg. At baseline the patient ambulates without assistance. Pt denies recent illness, but was hospitalized last week for TIA. She took her normal medications this morning but has not tried any additional pain medications. There are no other acute medical concerns at this time. Social hx: denies tobacco use, denies EtOH    PCP: Simon Lai MD  Full history, physical exam, and ROS unable to be obtained due to:  dementia. Note written by Evelina Lino, as dictated by Freida Andrew MD 8:30 AM      The history is provided by the patient and a relative. No  was used.         Past Medical History:   Diagnosis Date    Anemia of chronic disease 11/03    [place]-hem    Carotid artery disease (HCC)     repeat dopplers 3/14    Dehydration     Dementia ([facility] [address]     Goiter     s/p thyroidectomy, benign    Lumbar disc disease     ruptured disc L4-L5 s/p epidural  [place]    Osteoporosis, unspecified 12/03    Pure hypercholesterolemia 11/03    TIA (transient ischemic attack)     Unspecified essential hypertension        Past Surgical History:   Procedure Laterality Date    HX COLONOSCOPY      PUCT/ASPIR BREAST CYST,EACH ADDN  2/04    right breast    THYROIDECTOMY  5/10    benign goiter  Brown/surg         Family History:   Problem Relation Age of Onset    Hypertension Mother     Hypertension Father        Social History     Socioeconomic History    Marital status:      Spouse name: Not on file    Number of children: Not on file    Years of education: Not on file    Highest education level: Not on file   Occupational History    Not on file   Social Needs    Financial resource strain: Not on file    Food insecurity:     Worry: Not on file     Inability: Not on file    Transportation needs:     Medical: Not on file     Non-medical: Not on file   Tobacco Use    Smoking status: Never Smoker    Smokeless tobacco: Never Used   Substance and Sexual Activity    Alcohol use: No    Drug use: No    Sexual activity: Not Currently   Lifestyle    Physical activity:     Days per week: Not on file     Minutes per session: Not on file    Stress: Not on file   Relationships    Social connections:     Talks on phone: Not on file     Gets together: Not on file     Attends Gnosticism service: Not on file     Active member of club or organization: Not on file     Attends meetings of clubs or organizations: Not on file     Relationship status: Not on file    Intimate partner violence:     Fear of current or ex partner: Not on file     Emotionally abused: Not on file     Physically abused: Not on file     Forced sexual activity: Not on file   Other Topics Concern     Service Not Asked    Blood Transfusions Not Asked    Caffeine Concern Not Asked    Occupational Exposure Not Asked   [place] Not Asked    Sleep Concern Not Asked    Stress Concern Not Asked    Weight Concern Not Asked    Special Diet Not Asked    Back Care Not Asked    Exercise Yes     Comment: walks    Bike Helmet Not Asked    Seat Belt Not Asked    Self-Exams Not Asked   Social History Narrative    Lives with her daughter's family             ALLERGIES: Patient has no known allergies. Review of Systems   Unable to perform ROS: Dementia       Vitals:    10/10/19 0754   BP: 160/74   Pulse: 77   Resp: 16   Temp: 98 °F (36.7 °C)   SpO2: 98%            Physical Exam   Constitutional: She is oriented to person, place, and time.  She appears well-developed and well-nourished. Dementia at baseline, but answering questions appropriately    HENT:   Head: Normocephalic and atraumatic. Right Ear: External ear normal.   Left Ear: External ear normal.   Nose: Nose normal.   Mouth/Throat: Oropharynx is clear and moist.   Eyes: Pupils are equal, round, and reactive to light. EOM are normal. No scleral icterus. Neck: Normal range of motion. Neck supple. No JVD present. No tracheal deviation present. No thyromegaly present. Cardiovascular: Normal rate, regular rhythm, normal heart sounds, intact distal pulses and normal pulses. Exam reveals no friction rub. No murmur heard. Pulmonary/Chest: Effort normal and breath sounds normal. No stridor. No respiratory distress. She has no wheezes. She has no rales. She exhibits no tenderness. Abdominal: Soft. Bowel sounds are normal. She exhibits no distension. There is no tenderness. There is no rebound and no guarding. Musculoskeletal: Normal range of motion. She exhibits no edema. Mild tenderness to R lower tib fib, without redness, warmth or swelling   Able to stand without much difficulty   Lymphadenopathy:     She has no cervical adenopathy. Neurological: She is alert and oriented to person, place, and time. She has normal reflexes. No cranial nerve deficit. Coordination normal.   Skin: Skin is warm and dry. No rash noted. No erythema. Psychiatric: She has a normal mood and affect. Her behavior is normal. Judgment and thought content normal.   Nursing note and vitals reviewed. Note written by Evelina Gibbs, as dictated by Sarah Glez MD 8:30 AM        MDM  Number of Diagnoses or Management Options  Diagnosis management comments: 82-year-old -American female presents to the emergency department with right lower leg pain. Pain seems to be fairly mild. Pain is in the right lower tib-fib. Will check x-rays. Will check ultrasound. Will give Tylenol. No signs of cellulitis.   No real signs of DVT either. Amount and/or Complexity of Data Reviewed  Tests in the radiology section of CPT®: ordered and reviewed  Decide to obtain previous medical records or to obtain history from someone other than the patient: yes  Obtain history from someone other than the patient: yes  Review and summarize past medical records: yes  Independent visualization of images, tracings, or specimens: yes           Procedures    PROGRESS NOTE:  9:34 AM  X ray and doppler are negative  Will treat with tylenol and d/c home with PCP follow up to discuss PT as needed    Good return precautions given to patient. Close follow up with PCP recommended. Patient and/or family voices understanding of this plan. Discharge instructions were explained by me and all concerns were addressed.

## 2019-10-15 ENCOUNTER — OFFICE VISIT (OUTPATIENT)
Dept: FAMILY MEDICINE CLINIC | Age: 84
End: 2019-10-15

## 2019-10-15 VITALS
BODY MASS INDEX: 18.93 KG/M2 | WEIGHT: 96.4 LBS | SYSTOLIC BLOOD PRESSURE: 138 MMHG | OXYGEN SATURATION: 99 % | DIASTOLIC BLOOD PRESSURE: 70 MMHG | HEIGHT: 60 IN | RESPIRATION RATE: 18 BRPM | HEART RATE: 64 BPM

## 2019-10-15 DIAGNOSIS — D63.8 ANEMIA, CHRONIC DISEASE: ICD-10-CM

## 2019-10-15 DIAGNOSIS — Z13.5 GLAUCOMA SCREENING: ICD-10-CM

## 2019-10-15 DIAGNOSIS — I65.23 CAROTID STENOSIS, BILATERAL: ICD-10-CM

## 2019-10-15 DIAGNOSIS — G30.1 LATE ONSET ALZHEIMER'S DISEASE WITHOUT BEHAVIORAL DISTURBANCE (HCC): ICD-10-CM

## 2019-10-15 DIAGNOSIS — R73.02 GLUCOSE INTOLERANCE (IMPAIRED GLUCOSE TOLERANCE): ICD-10-CM

## 2019-10-15 DIAGNOSIS — G45.9 TIA (TRANSIENT ISCHEMIC ATTACK): ICD-10-CM

## 2019-10-15 DIAGNOSIS — I10 HYPERTENSION, UNCONTROLLED: ICD-10-CM

## 2019-10-15 DIAGNOSIS — D64.9 SEVERE ANEMIA: ICD-10-CM

## 2019-10-15 DIAGNOSIS — M81.0 AGE-RELATED OSTEOPOROSIS WITHOUT CURRENT PATHOLOGICAL FRACTURE: ICD-10-CM

## 2019-10-15 DIAGNOSIS — N18.30 CHRONIC RENAL INSUFFICIENCY, STAGE III (MODERATE) (HCC): ICD-10-CM

## 2019-10-15 DIAGNOSIS — E03.9 ACQUIRED HYPOTHYROIDISM: ICD-10-CM

## 2019-10-15 DIAGNOSIS — F02.80 LATE ONSET ALZHEIMER'S DISEASE WITHOUT BEHAVIORAL DISTURBANCE (HCC): ICD-10-CM

## 2019-10-15 DIAGNOSIS — Z00.00 MEDICARE ANNUAL WELLNESS VISIT, SUBSEQUENT: Primary | ICD-10-CM

## 2019-10-15 RX ORDER — METOPROLOL TARTRATE 25 MG/1
TABLET, FILM COATED ORAL
Qty: 270 TAB | Refills: 1 | Status: SHIPPED | OUTPATIENT
Start: 2019-10-15 | End: 2019-12-13 | Stop reason: SDUPTHER

## 2019-10-15 RX ORDER — IRBESARTAN AND HYDROCHLOROTHIAZIDE 150; 12.5 MG/1; MG/1
TABLET, FILM COATED ORAL
Qty: 90 TAB | Refills: 1 | Status: SHIPPED | OUTPATIENT
Start: 2019-10-15 | End: 2019-10-26 | Stop reason: SDUPTHER

## 2019-10-15 RX ORDER — ATORVASTATIN CALCIUM 40 MG/1
40 TABLET, FILM COATED ORAL
Qty: 90 TAB | Refills: 1 | Status: SHIPPED | OUTPATIENT
Start: 2019-10-15 | End: 2020-01-23

## 2019-10-15 NOTE — PATIENT INSTRUCTIONS
For help with making an advanced directive (living will), please call our nurse navigator Malena Ignacio RN, at [phone] Well Visit, Over 72: Care Instructions Your Care Instructions Physical exams can help you stay healthy. Your doctor has checked your overall health and may have suggested ways to take good care of yourself. He or she also may have recommended tests. At home, you can help prevent illness with healthy eating, regular exercise, and other steps. Follow-up care is a key part of your treatment and safety. Be sure to make and go to all appointments, and call your doctor if you are having problems. It's also a good idea to know your test results and keep a list of the medicines you take. How can you care for yourself at home? · Reach and stay at a healthy weight. This will lower your risk for many problems, such as obesity, diabetes, heart disease, and high blood pressure. · Get at least 30 minutes of exercise on most days of the week. Walking is a good choice. You also may want to do other activities, such as running, swimming, cycling, or playing tennis or team sports. · Do not smoke. Smoking can make health problems worse. If you need help quitting, talk to your doctor about stop-smoking programs and medicines. These can increase your chances of quitting for good. · Protect your skin from too much sun. When you're outdoors from 10 a.m. to 4 p.m., stay in the shade or cover up with clothing and a hat with a wide brim. Wear sunglasses that block UV rays. Even when it's cloudy, put broad-spectrum sunscreen (SPF 30 or higher) on any exposed skin. · See a dentist one or two times a year for checkups and to have your teeth cleaned. · Wear a seat belt in the car. Follow your doctor's advice about when to have certain tests. These tests can spot problems early. For men and women · Cholesterol.  Your doctor will tell you how often to have this done based on your overall health and other things that can increase your risk for heart attack and stroke. · Blood pressure. Have your blood pressure checked during a routine doctor visit. Your doctor will tell you how often to check your blood pressure based on your age, your blood pressure results, and other factors. · Diabetes. Ask your doctor whether you should have tests for diabetes. · Vision. Experts recommend that you have yearly exams for glaucoma and other age-related eye problems. · Hearing. Tell your doctor if you notice any change in your hearing. You can have tests to find out how well you hear. · Colon cancer tests. Keep having colon cancer tests as your doctor recommends. You can have one of several types of tests. · Heart attack and stroke risk. At least every 4 to 6 years, you should have your risk for heart attack and stroke assessed. Your doctor uses factors such as your age, blood pressure, cholesterol, and whether you smoke or have diabetes to show what your risk for a heart attack or stroke is over the next 10 years. · Osteoporosis. Talk to your doctor about whether you should have a bone density test to find out whether you have thinning bones. Also ask your doctor about whether you should take calcium and vitamin D supplements. For women · Pap test and pelvic exam. You may no longer need a Pap test. Talk with your doctor about whether to stop or continue to have Pap tests. · Breast exam and mammogram. Ask how often you should have a mammogram, which is an X-ray of your breasts. A mammogram can spot breast cancer before it can be felt and when it is easiest to treat. · Thyroid disease. Talk to your doctor about whether to have your thyroid checked as part of a regular physical exam. Women have an increased chance of a thyroid problem. For men · Prostate exam. Talk to your doctor about whether you should have a blood test (called a PSA test) for prostate cancer.  Experts recommend that you discuss the benefits and risks of the test with your doctor before you decide whether to have this test. Some experts say that men ages 79 and older no longer need testing. · Abdominal aortic aneurysm. Ask your doctor whether you should have a test to check for an aneurysm. You may need a test if you ever smoked or if your parent, brother, sister, or child has had an aneurysm. When should you call for help? Watch closely for changes in your health, and be sure to contact your doctor if you have any problems or symptoms that concern you. Where can you learn more? Go to http://rocaelSparkbuychyna.info/. Enter W048 in the search box to learn more about \"Well Visit, Over 65: Care Instructions. \" Current as of: December 13, 2018 Content Version: 12.2 © 3963-1610 [employer]. Care instructions adapted under license by [employer] (which disclaims liability or warranty for this information). If you have questions about a medical condition or this instruction, always ask your healthcare professional. [place] [address] any warranty or liability for your use of this information. Learning About How to Prevent Another Stroke What can you do to prevent another stroke? After a stroke, people feel lots of different emotions. Some people are worried that they could have another stroke. Or they may feel overwhelmed by how much there is to learn and do. Some people feel sad or depressed. No matter what emotions you are feeling, you can give yourself some control and peace of mind by making a plan to lower your risk of having another stroke. Take your medicines You'll need to take medicines to help prevent another stroke. Be sure to take your medicines exactly as prescribed. And don't stop taking them unless your doctor tells you to. If you stop taking your medicines, you can increase your risk of having another stroke. Some of the medicines your doctor may prescribe include: · Aspirin or some other blood thinner to prevent blood clots. · Statins and other medicines to lower cholesterol. · Blood pressure medicines to lower blood pressure. Manage other health problems You can help lower your chance of having another stroke by managing certain other health problems. Problems that increase your risk of having another stroke include: · High blood pressure. · High cholesterol. · Atrial fibrillation. · Diabetes. If you have any of these health problems, you can manage them with healthy lifestyle changes along with medicine. Adopt a healthy lifestyle · Do not smoke or allow others to smoke around you. If you need help quitting, talk to your doctor about stop-smoking programs and medicines. These can increase your chances of quitting for good. Smoking makes a stroke more likely. · Limit alcohol to 2 drinks a day for men and 1 drink a day for women. · Lose weight if you need to. Controlling your weight will help you keep your heart and body healthy. · Be active. Ask your doctor what type and level of activity is safe for you. · Eat heart-healthy foods, like fruits, vegetables, and high-fiber foods. It's also important to: · Get a flu shot every year. · Ask for help if you think you are depressed. Do stroke rehab Taking part in a stroke rehabilitation (rehab) program will help you to regain skills you lost or make the most of your abilities after a stroke. It also helps you take steps to prevent another stroke. Your rehab team will give you education and support to help you build new, healthy habits. You'll learn how to manage any other health problems that you might have. Anna Burt also learn how to exercise safely, eat a healthy diet, and quit smoking if you smoke. Anna Burt work with your team to decide what lifestyle choices are best for you.  
If your doctor hasn't already suggested it, ask him or her if stroke rehab is right for you. Know stroke symptoms Make sure you know the symptoms of stroke. FAST is a simple way to remember. Recognizing these symptoms helps you know when to call for medical help. FAST stands for: 
· Face drooping. · Arm weakness. · Speech difficulty. · Time to call 911. Follow-up care is a key part of your treatment and safety. Be sure to make and go to all appointments, and call your doctor if you are having problems. It's also a good idea to know your test results and keep a list of the medicines you take. Where can you learn more? Go to http://rocael-chyna.info/. Enter Z399 in the search box to learn more about \"Learning About How to Prevent Another Stroke. \" Current as of: September 26, 2018 Content Version: 12.2 © 7098-0782 [employer]. Care instructions adapted under license by [employer] (which disclaims liability or warranty for this information). If you have questions about a medical condition or this instruction, always ask your healthcare professional. [address] any warranty or liability for your use of this information.

## 2019-10-15 NOTE — PROGRESS NOTES
Chief Complaint   Patient presents with   [place] Annual Wellness Visit     1. Have you been to the ER, urgent care clinic since your last visit? Hospitalized since your last visit? No    2. Have you seen or consulted any other health care providers outside of the [address] [place] [address] since your last visit? Include any pap smears or colon screening.  No

## 2019-10-15 NOTE — PROGRESS NOTES
Nuno Grier [facility]  [address] [place], [address]  [phone]           Date of visit: 10/15/2019       This is an Subsequent Medicare Annual Wellness Visit (AWV)    I have reviewed the patient's medical history in detail and updated the computerized patient record. Kev Logan is a 82 y.o. female   History obtained from: patient, daughter. Concerns today   -was in ER 5 days ago with right lower leg pain. Xray without bony findings, vascular calcification was noted. Doppler neg for DVT. Pain has not recured  Not sure what it was  Denies other pains    -was hospitalized at [facility] 9/27-9/29 for TIA (had facial droop and syncope, recovered quickly)  -was found to have bilateral carotid stenosis, recommended repeat doppler in 6 mo  -also a small carotid aneurysm. Was seen by neurointerventionalist Kalee Kaminski. They discussed intervention options and decided to delay for now given her comorbidities  Has a follow up appt with him next week  Statin dose was increased  No more of those symptoms  Sent PT/OT per home health but they quickly released her    -thyroid was off last year and we increased dose  -level needs recheck    Checks bp at home once weekly, always ok    Dementia a little worse after hospital  However is happy, not feeling upset or agitated  Reads, sleeps  Not watching much tv    Osteoporosis--tried fosamax last year and felt like it made her bones hurt.    Had discussed trying it again  Would prefer to try prolia as the bone pains were really bad  Her renal function is also borderline for it    Renal function stable but stage 3    Also had worse anemia in hospital b-12 level in the 300s  Iron levels ok  Confirmed chronic disease in the past    History     Patient Active Problem List   Diagnosis Code    Hypothyroidism E03.9    Anemia, chronic disease D63.8    Chronic renal insufficiency, stage III (moderate) ([facility] [address] N18.3    Hypertension, uncontrolled I10    Kyphosis (acquired) (postural) M40.00    Glucose intolerance (impaired glucose tolerance) R73.02    Alzheimer's dementia (HCC) G30.9, F02.80    Carotid stenosis, bilateral I65.23    Age-related osteoporosis without current pathological fracture M81.0    Syncope R55    TIA (transient ischemic attack) G45.9    Cerebral aneurysm without rupture I67.1     Past Medical History:   Diagnosis Date    Anemia of chronic disease 11/03    [place]-hem    Carotid artery disease ([facility] [address]     repeat dopplers 3/14    Dehydration     Dementia ([facility] [address]     Goiter     s/p thyroidectomy, benign    Lumbar disc disease     ruptured disc L4-L5 s/p epidural  [place]/os    Osteoporosis, unspecified 12/03    Pure hypercholesterolemia 11/03    TIA (transient ischemic attack)     Unspecified essential hypertension       Past Surgical History:   Procedure Laterality Date    HX COLONOSCOPY      PUCT/ASPIR BREAST CYST,EACH ADDN  2/04    right breast    THYROIDECTOMY  5/10    benign goiter  Brown/surg     Allergies   Allergen Reactions    Fosamax [Alendronate] Other (comments)     Bones ached     Current Outpatient Medications   Medication Sig Dispense Refill    varicella-zoster recombinant, PF, (SHINGRIX, PF,) 50 mcg/0.5 mL susr injection 0.5 mL by IntraMUSCular route once for 1 dose. 0.5 mL 1    diphtheria-pertussis, acellular,-tetanus (BOOSTRIX TDAP) 2.5-8-5 Lf-mcg-Lf/0.5mL susp susp 0.5 mL by IntraMUSCular route once for 1 dose. 0.5 mL 0    pneumococcal 13 mina conj dip (PREVNAR-13) 0.5 mL syrg injection 0.5 mL by IntraMUSCular route once for 1 dose. 0.5 mL 0    atorvastatin (LIPITOR) 40 mg tablet Take 1 Tab by mouth nightly.  90 Tab 1    metoprolol tartrate (LOPRESSOR) 25 mg tablet TAKE 1 TABLET BY MOUTH EVERY MORNING AND 2 TABLETS BY MOUTH EVERY EVENING 270 Tab 1    irbesartan-hydroCHLOROthiazide (AVALIDE) 150-12.5 mg per tablet TAKE 1 TABLET BY MOUTH EVERY DAY 90 Tab 1    acetaminophen (TYLENOL) 325 mg tablet Take 2 Tabs by mouth every six (6) hours as needed for Pain. 20 Tab 0    levothyroxine (SYNTHROID) 88 mcg tablet Take 1 Tab by mouth Daily (before breakfast). Increased dose 90 Tab 1    aspirin delayed-release (ASPIR-81) 81 mg tablet Take 81 mg by mouth daily. Family History   Problem Relation Age of Onset    Hypertension Mother     Hypertension Father      Social History     Tobacco Use    Smoking status: Never Smoker    Smokeless tobacco: Never Used   Substance Use Topics    Alcohol use: No       Specialists/Care Team   [employer] has established care with the following healthcare providers:  Patient Care Team:  Manuel Pérez MD as PCP - General (Family Practice)  Jeffrey Melissa MD (Cardiology)  Marcella Oreilly MD (Neurology)  Kevin Norton MD (Vascular Surgery)  Sue Brown, RN as Ambulatory Care Navigator (Internal Medicine)  Dylan Camejo RN as Ambulatory Care Navigator  Eye doctor--doesn't have one but will try to go    Health Risk Assessment     Demographics   female  82 y.o. General Health Questions   -During the past 4 weeks:   -how would you rate your health in general? Good   -how often have you been bothered by feeling dizzy when standing up? never   -how much have you been bothered by bodily pain? not   -Have you noticed any hearing difficulties? No (daughter says a little problem)   -has your physical and emotional health limited your social activities with family or friends?  Not really, stays home, been a  for a long time    Emotional Health Questions   -Do you have a history of depression, anxiety, or emotional problems? no    3 most recent PHQ Screens 10/15/2019   PHQ Not Done -   Little interest or pleasure in doing things Not at all   Feeling down, depressed, irritable, or hopeless Not at all   Total Score PHQ 2 0      Health Habits   Please describe your diet habits: cornflakes, recently started putting vanilla protein shake on it, still eats candy, does eat veggies  Do you get 5 servings of fruits or vegetables daily? no  Do you exercise regularly? Not much, mostly in the house, occasionally walks to mailbox but not since she left the hospital    Activities of Daily Living and Functional Status   -Do you need help with eating, walking, dressing, bathing, toileting, the phone, transportation, shopping, preparing meals, housework, laundry, medications or managing money? yes  -In the past four weeks, was someone available to help you if you needed and wanted help with anything? yes  -Are you confident are you that you can control and manage most of your health problems? yes  -Have you been given information to help you keep track of your medications? Yes, daughter keeps track but patient remembers  -How often do you have trouble taking your medications as prescribed? never    Fall Risk and Home Safety   Have you fallen 2 or more times in the past year? no  Does your home have rugs in the hallway, lack grab bars in the bathroom, lack handrails on the stairs or have poor lighting? no  Do you have smoke detectors and check them regularly? yes  Do you have difficulties driving a car? Doesn't drive  Do you always fasten your seat belt when you are in a car? yes    Review of Systems (if indicated for problems addressed today)   Card: denies chest pain  Pulm: denies shortness of breath    Physical Examination     Vitals:    10/15/19 1027 10/15/19 1123   BP: 159/63 138/70   Pulse: 64    Resp: 18    SpO2: 99%    Weight: 96 lb 6.4 oz (43.7 kg)    Height: 5' (1.524 m)      Body mass index is 18.83 kg/m².    No exam data present  Was the patient's timed Up & Go test unsteady or longer than 30 seconds? no    Evaluation of Cognitive Function   Mood/affect:  happy  Orientation: knows person  Appearance: age appropriate  Family member/caregiver input: memory slowly getting worse, per daughter    Additional exam if indicated for problems addressed today:  General: stated age, well developed, well nourished and in NAD  Neck: supple, symmetrical, trachea midline, no adenopathy and thyroid: not enlarged, symmetric, no tenderness/mass/nodules  Lungs:  clear to auscultation w/o rales, rhonchi, wheezes w/normal effort and no use of accessory muscles of respiration   Heart: regular rate and rhythm, S1, S2 normal, no murmur, click, rub or gallop  Abdomen: soft, nontender, no masses  Ext:  No edema noted. Lymph: no cervical adenopathy appreciated  Skin:  Normal. and no rash or abnormalities   Psych: alert and happy and Speech: doesn't talk much      Advice/Referrals/Counseling (as indicated)   Education and counseling provided for any problems identified above: diet, exercise    Preventive Services     (Preventive care checklist to be included in patient instructions)  Discussed today Done Previously Not Needed    X 13 rx given    Pneumococcal vaccines    x  Flu vaccine     x Hepatitis B vaccine (if at risk)   X rx given    Shingles vaccine   X rx given    TDAP vaccine    X decines  Mammogram     x Pap smear     x Colorectal cancer screening     x Low-dose CT for lung cancer screening    X 2017  Bone density test   X refer today   Glaucoma screening    x  Cholesterol test    x  Diabetes screening test      x Diabetes self-management class     x Nutritionist referral for diabetes or renal disease     Discussion of Advance Directive   Discussed with Abel Rdz. [place] her ability to prepare and advance directive in the case that an injury or illness causes her to be unable to make health care decisions. Doesn't have one, gave honoring choices folder last year, discussed, not sure she is interested    Assessment/Plan   Z00.00    ICD-10-CM ICD-9-CM    1. Medicare annual wellness visit, subsequent Z00.00 V70.0    2. Hypertension, uncontrolled I10 401.9 irbesartan-hydroCHLOROthiazide (AVALIDE) 150-12.5 mg per tablet   3.  Late onset Alzheimer's disease without behavioral disturbance (HCC) G30.1 331.0 F02.80 294.10    4. Chronic renal insufficiency, stage III (moderate) (HCC) Y14.6 171.2 METABOLIC PANEL, BASIC   5. Age-related osteoporosis without current pathological fracture M81.0 733.01    6. Anemia, chronic disease D63.8 285.29    7. Glucose intolerance (impaired glucose tolerance) R73.02 790.22    8. Acquired hypothyroidism E03.9 244.9 TSH 3RD GENERATION   9. Carotid stenosis, bilateral I65.23 433.10      433.30    10. TIA (transient ischemic attack) G45.9 435.9    11. Severe anemia D64.9 285.9 PATHOLOGIST REVIEW SMEARS      METHYLMALONIC ACID   12. Glaucoma screening Z13.5 V80.1 REFERRAL TO OPHTHALMOLOGY       Orders Placed This Encounter    METABOLIC PANEL, BASIC    TSH 3RD GENERATION    METHYLMALONIC ACID    REFERRAL TO OPHTHALMOLOGY    varicella-zoster recombinant, PF, (SHINGRIX, PF,) 50 mcg/0.5 mL susr injection    diphtheria-pertussis, acellular,-tetanus (BOOSTRIX TDAP) 2.5-8-5 Lf-mcg-Lf/0.5mL susp susp    pneumococcal 13 mina conj dip (PREVNAR-13) 0.5 mL syrg injection    atorvastatin (LIPITOR) 40 mg tablet    metoprolol tartrate (LOPRESSOR) 25 mg tablet    irbesartan-hydroCHLOROthiazide (AVALIDE) 150-12.5 mg per tablet    PATHOLOGIST REVIEW SMEARS     Preventive care as above  Chronic problems stable  Recent TIA and will follow up with neurovascular interventionalist but they did not recommend surgeryfor the small carotid aneurysm  Tolerating increased statin and still on baby aspirin  Dementia maybe a little worse but no behavior problems and is happy  Her anemia is quite severe for chronic disease, check more labs as above  Will try to get her prolia for her bones; could not tolerate bisphosphonate with bones aching  Follow-up and Dispositions    · Return in about 6 months (around 4/15/2020) for Follow up.          Lili Hinojosa MD

## 2019-10-18 LAB
BASOPHILS # BLD AUTO: 0 X10E3/UL (ref 0–0.2)
BASOPHILS NFR BLD AUTO: 1 %
BUN SERPL-MCNC: 28 MG/DL (ref 8–27)
BUN/CREAT SERPL: 25 (ref 12–28)
CALCIUM SERPL-MCNC: 9.1 MG/DL (ref 8.7–10.3)
CHLORIDE SERPL-SCNC: 99 MMOL/L (ref 96–106)
CO2 SERPL-SCNC: 22 MMOL/L (ref 20–29)
CREAT SERPL-MCNC: 1.14 MG/DL (ref 0.57–1)
EOSINOPHIL # BLD AUTO: 0 X10E3/UL (ref 0–0.4)
EOSINOPHIL NFR BLD AUTO: 1 %
ERYTHROCYTE [DISTWIDTH] IN BLOOD BY AUTOMATED COUNT: 15.3 % (ref 12.3–15.4)
GLUCOSE SERPL-MCNC: 84 MG/DL (ref 65–99)
HCT VFR BLD AUTO: 29.7 % (ref 34–46.6)
HGB BLD-MCNC: 9.1 G/DL (ref 11.1–15.9)
IMM GRANULOCYTES # BLD AUTO: 0 X10E3/UL (ref 0–0.1)
IMM GRANULOCYTES NFR BLD AUTO: 0 %
INTERPRETATION: NORMAL
LYMPHOCYTES # BLD AUTO: 1 X10E3/UL (ref 0.7–3.1)
LYMPHOCYTES NFR BLD AUTO: 26 %
Lab: NORMAL
MCH RBC QN AUTO: 22.5 PG (ref 26.6–33)
MCHC RBC AUTO-ENTMCNC: 30.6 G/DL (ref 31.5–35.7)
MCV RBC AUTO: 73 FL (ref 79–97)
METHYLMALONATE SERPL-SCNC: 310 NMOL/L (ref 0–378)
MONOCYTES # BLD AUTO: 0.3 X10E3/UL (ref 0.1–0.9)
MONOCYTES NFR BLD AUTO: 7 %
NEUTROPHILS # BLD AUTO: 2.6 X10E3/UL (ref 1.4–7)
NEUTROPHILS NFR BLD AUTO: 65 %
PATH REV BLD -IMP: ABNORMAL
PATHOLOGIST NAME: ABNORMAL
PLATELET # BLD AUTO: 378 X10E3/UL (ref 150–450)
POTASSIUM SERPL-SCNC: 3.5 MMOL/L (ref 3.5–5.2)
RBC # BLD AUTO: 4.05 X10E6/UL (ref 3.77–5.28)
SODIUM SERPL-SCNC: 140 MMOL/L (ref 134–144)
TSH SERPL DL<=0.005 MIU/L-ACNC: 1.45 UIU/ML (ref 0.45–4.5)
WBC # BLD AUTO: 4 X10E3/UL (ref 3.4–10.8)

## 2019-10-19 RX ORDER — LEVOTHYROXINE SODIUM 88 UG/1
88 TABLET ORAL
Qty: 90 TAB | Refills: 3 | Status: SHIPPED | OUTPATIENT
Start: 2019-10-19 | End: 2020-01-29 | Stop reason: DRUGHIGH

## 2019-10-19 NOTE — PROGRESS NOTES
Sent in letter:  Further testing did not show a new cause for your anemia. This is good news. Various vitamin levels have been checked, including iron, and all have been normal. I think your anemia is still the \"chronic disease\" type, which comes from kidney problems. The good news is that your kidney disease is mild and was improved on this most recent test.  Electrolytes and thyroid were also fine. I sent a refill of your thyroid medicine to your pharmacy.

## 2019-10-22 ENCOUNTER — PATIENT OUTREACH (OUTPATIENT)
Dept: INTERNAL MEDICINE CLINIC | Age: 84
End: 2019-10-22

## 2019-10-24 ENCOUNTER — OFFICE VISIT (OUTPATIENT)
Dept: NEUROSURGERY | Age: 84
End: 2019-10-24

## 2019-10-24 DIAGNOSIS — I67.1 CEREBRAL ANEURYSM: Primary | ICD-10-CM

## 2019-10-25 NOTE — PROGRESS NOTES
Neurointerventional Surgery  Ambulatory Progress Note      Patient: Izabela Awan MRN: [id number]  SSN: [id number]    YOB: 1935  Age: 82 y.o. Sex: female      History of Present Illness:      Ms. Sosa Lozano is an 82year old female who presents to clinic today to discuss an incidental finding of a left PCOM aneurysm. She has a PMH of hypothyroidism, CKD III, hypertension, dementia, CAD, TIA, and bilateral carotid stenosis, hyperlipidemia and osteoporosis. She was recently discharged from [facility] after having a full work up for TIA. Since this time she has been doing well. She presents to the clinic with her daughter and cousin today. They report that her dementia is fairly significant and that she likely will not understand much of conversations had today. The patient has no acute complaints at this time. Her daughter states that she has trouble getting her to \"eat\" but otherwise she has been doing well. Patient Active Problem List   Diagnosis Code    Hypothyroidism E03.9    Anemia, chronic disease D63.8    Chronic renal insufficiency, stage III (moderate) (HCC) N18.3    Hypertension, uncontrolled I10    Kyphosis (acquired) (postural) M40.00    Glucose intolerance (impaired glucose tolerance) R73.02    Alzheimer's dementia (HCC) G30.9, F02.80    Carotid stenosis, bilateral I65.23    Age-related osteoporosis without current pathological fracture M81.0    Syncope R55    TIA (transient ischemic attack) G45.9    Cerebral aneurysm without rupture I67.1     Current Outpatient Medications   Medication Sig Dispense Refill    levothyroxine (SYNTHROID) 88 mcg tablet Take 1 Tab by mouth Daily (before breakfast). 90 Tab 3    atorvastatin (LIPITOR) 40 mg tablet Take 1 Tab by mouth nightly.  90 Tab 1    metoprolol tartrate (LOPRESSOR) 25 mg tablet TAKE 1 TABLET BY MOUTH EVERY MORNING AND 2 TABLETS BY MOUTH EVERY EVENING 270 Tab 1    irbesartan-hydroCHLOROthiazide (AVALIDE) 150-12.5 mg per tablet TAKE 1 TABLET BY MOUTH EVERY DAY 90 Tab 1    acetaminophen (TYLENOL) 325 mg tablet Take 2 Tabs by mouth every six (6) hours as needed for Pain. 20 Tab 0    aspirin delayed-release (ASPIR-81) 81 mg tablet Take 81 mg by mouth daily. Allergies   Allergen Reactions    Fosamax [Alendronate] Other (comments)     Bones ached     Past Medical History:   Diagnosis Date    Anemia of chronic disease 11/03    [place]-hem    Carotid artery disease ([facility] [address]     repeat dopplers 3/14    Dehydration     Dementia ([facility] [address]     Goiter     s/p thyroidectomy, benign    Lumbar disc disease     ruptured disc L4-L5 s/p epidural  [place]/os    Osteoporosis, unspecified 12/03    Pure hypercholesterolemia 11/03    TIA (transient ischemic attack)     Unspecified essential hypertension      Past Surgical History:   Procedure Laterality Date    HX COLONOSCOPY      PUCT/ASPIR BREAST CYST,EACH ADDN  2/04    right breast    THYROIDECTOMY  5/10    benign goiter  Brown/surg     Family History   Problem Relation Age of Onset    Hypertension Mother     Hypertension Father      Social History     Tobacco Use    Smoking status: Never Smoker    Smokeless tobacco: Never Used   Substance Use Topics    Alcohol use: No        Review of Systems    A comprehensive ROS was performed and was negative except for as per HPI. Objective:     Current Outpatient Medications   Medication Sig Dispense Refill    levothyroxine (SYNTHROID) 88 mcg tablet Take 1 Tab by mouth Daily (before breakfast). 90 Tab 3    atorvastatin (LIPITOR) 40 mg tablet Take 1 Tab by mouth nightly. 90 Tab 1    metoprolol tartrate (LOPRESSOR) 25 mg tablet TAKE 1 TABLET BY MOUTH EVERY MORNING AND 2 TABLETS BY MOUTH EVERY EVENING 270 Tab 1    irbesartan-hydroCHLOROthiazide (AVALIDE) 150-12.5 mg per tablet TAKE 1 TABLET BY MOUTH EVERY DAY 90 Tab 1    acetaminophen (TYLENOL) 325 mg tablet Take 2 Tabs by mouth every six (6) hours as needed for Pain.  20 Tab 0    aspirin delayed-release (ASPIR-81) 81 mg tablet Take 81 mg by mouth daily. There were no vitals taken for this visit. Allergies   Allergen Reactions    Fosamax [Alendronate] Other (comments)     Bones ached       Current Outpatient Medications   Medication Sig    levothyroxine (SYNTHROID) 88 mcg tablet Take 1 Tab by mouth Daily (before breakfast).  atorvastatin (LIPITOR) 40 mg tablet Take 1 Tab by mouth nightly.  metoprolol tartrate (LOPRESSOR) 25 mg tablet TAKE 1 TABLET BY MOUTH EVERY MORNING AND 2 TABLETS BY MOUTH EVERY EVENING    irbesartan-hydroCHLOROthiazide (AVALIDE) 150-12.5 mg per tablet TAKE 1 TABLET BY MOUTH EVERY DAY    acetaminophen (TYLENOL) 325 mg tablet Take 2 Tabs by mouth every six (6) hours as needed for Pain.  aspirin delayed-release (ASPIR-81) 81 mg tablet Take 81 mg by mouth daily. No current facility-administered medications for this visit. Physical Exam:  General: Elderly female, cachectic, pleasant and cooperative  Lungs: clear to auscultation bilaterally  Heart: regular rate and rhythm, S1, S2 normal, no murmur, click, rub or gallop  Extremities: extremities normal, atraumatic, no cyanosis or edema  Pulses: 2+ and symmetric    Neurologic Exam:  Mental Status:  Alert and oriented x 2. Appropriate affect, mood and behavior. Language:    Normal fluency, repetition, comprehension and naming. Cranial Nerves:   Pupils equal, round and reactive to light. Visual fields full to confrontation. Extraocular movements intact. Facial sensation intact V1 - V3. Full facial strength, no asymmetry. Hearing intact bilaterally. No dysarthria. Tongue protrudes to midline, palate elevates symmetrically. Shoulder shrug 5/5 bilaterally. Motor:    No pronator drift. Bulk and tone normal.      5/5 power in all extremities proximally and distally. No involuntary movements. Sensation:    Sensation intact throughout to light touch.       Coordination & Gait: Gait is slow but steady. FTN intact. Labs:  Lab Results   Component Value Date/Time    Sodium 140 10/15/2019 11:31 AM    Potassium 3.5 10/15/2019 11:31 AM    Chloride 99 10/15/2019 11:31 AM    CO2 22 10/15/2019 11:31 AM    Anion gap 10 09/27/2019 05:59 PM    Glucose 84 10/15/2019 11:31 AM    BUN 28 (H) 10/15/2019 11:31 AM    Creatinine 1.14 (H) 10/15/2019 11:31 AM    BUN/Creatinine ratio 25 10/15/2019 11:31 AM    GFR est AA 52 (L) 10/15/2019 11:31 AM    GFR est non-AA 45 (L) 10/15/2019 11:31 AM    Calcium 9.1 10/15/2019 11:31 AM     Lab Results   Component Value Date/Time    WBC 4.0 10/15/2019 11:31 AM    HGB 9.1 (L) 10/15/2019 11:31 AM    HCT 29.7 (L) 10/15/2019 11:31 AM    PLATELET 011 38/43/5932 11:31 AM    MCV 73 (L) 10/15/2019 11:31 AM     Lab Results   Component Value Date/Time    MCH 22.5 (L) 10/15/2019 11:31 AM    MCHC 30.6 (L) 10/15/2019 11:31 AM    BASOPHILS 1 10/15/2019 11:31 AM    ABS. LYMPHOCYTES 1.0 09/27/2019 05:59 PM    ABS. MONOCYTES 0.3 10/15/2019 11:31 AM    ABS. EOSINOPHILS 0.0 10/15/2019 11:31 AM    ABS. BASOPHILS 0.0 10/15/2019 11:31 AM    Phosphorus 2.2 (L) 05/22/2010 04:00 AM    Magnesium 2.0 05/23/2010 04:00 AM       IMAGING:    All imaging reviewed by myself and Dr. Jong Pretty 9/27/2019    IMPRESSION:  1. No evidence of large vessel occlusion. 2.  Atherosclerotic plaque at the carotid bifurcations with 60% stenosis of the  proximal internal carotid arteries bilaterally. 3.  Atherosclerotic disease of the V4 segments of the vertebral arteries  bilaterally, severe on the left and mild to moderate on the right.   4.  7 x 5 mm inferiorly projecting left posterior communicating artery origin  aneurysm.       Assessment/Plan:       We discussed the characteristics and natural history of cerebral aneurysms, including the probability of intracranial hemorrhage related to rupture of this aneurysm as estimated by available data from the International Study of Unruptured Intracranial Aneurysms (ISUIA) study as well as the Phases trial. We relayed that a non-enlarging aneurysm of this size in this location would carry an approximately 5-14% risk of hemorrhage over five years. If the aneurysm is enlarging, the risk of rupture may be considerably higher, perhaps as much as ten times higher than estimated by ISUIA data. We also described the potential consequences of intracranial hemorrhage. We also discussed various strategies available to manage cerebral aneurysms, including endovascular treatment, open surgical treatment, and imaging surveillance:  1. Endovascular treatment: We discussed endovascular treatment in detail, including the use of general anesthesia, angiography, selective catheterization of intracranial vessels, endovascular embolization of the aneurysm, short-term hospitalization following the procedure, and treatment with antiplatelet medications before, during, and after the procedure. We also specifically discussed endovascular treatment options suitable for this type of aneurysm, including coil embolization, balloon-assisted coil embolization, stent-assisted coil embolization, and/or placement of a flow-diverting device. We discussed the risks associated with endovascular treatment, which include access site complications, thromboembolic complications, stroke, intracranial hemorrhage, vascular injury, contrast-induced nephropathy, and death, along with the separate risks of antiplatelet medications. We also discussed the possibility that multiple endovascular treatments might be needed to treat the aneurysm.   2. Open surgical treatment: We discussed the general approach to open microsurgical treatment of aneurysms, including the use of general anesthesia, craniotomy, retraction of cerebral tissues, placement of a metallic clip across the aneurysm neck, intraoperative angiography, inpatient hospitalization lasting several days, and outpatient recovery lasting several months. We discussed that the advantages of open microsurgical treatment compared to endovascular treatment include increased durability of treatment, and that the disadvantages of open microsurgical treatment compared to endovascular treatment include increased perioperative complications and longer period of recovery. 3. Imaging surveillance: We discussed that the general goal of imaging surveillance is to detect a change in the size or morphology of the aneurysm that would imply a higher risk of aneurysm rupture, which in turn might prompt reconsideration of treatment. We discussed that the patient would be continually exposed to the ongoing risk of aneurysm rupture during the period of surveillance, even if the aneurysm were to remain unchanged. Based on these discussions, the patient wishes to proceed with no treatment. The risks, benefits, and alternatives were discussed in detail. The patient and her family have a good understanding of the risk of treatment vs. No treatment and have opted to not intervene at this time. They feel that the risk of the procedure is too great. We have also decided not to continue with annual surveillance as this will not change plan of care regardless of any changes in the aneurysm size or shape. The patient or her family will call us if they have any questions or concerns moving forward or if they decide they would like to change their mind and move forward with treatment. Follow-up Disposition:    I have discussed the diagnosis with the patient and the intended plan as seen in the above orders. Patient is in agreement. The patient has received an after-visit summary and questions were answered concerning future plans. I have discussed medication side effects and warnings with the patient as well.       Camilo Cox NP

## 2019-10-31 ENCOUNTER — HOSPITAL ENCOUNTER (OUTPATIENT)
Dept: INFUSION THERAPY | Age: 84
Discharge: HOME | End: 2019-10-31
Payer: MEDICARE

## 2019-10-31 VITALS
OXYGEN SATURATION: 100 % | TEMPERATURE: 98.5 F | SYSTOLIC BLOOD PRESSURE: 150 MMHG | DIASTOLIC BLOOD PRESSURE: 84 MMHG | HEART RATE: 84 BPM | RESPIRATION RATE: 18 BRPM

## 2019-10-31 PROCEDURE — 96372 THER/PROPH/DIAG INJ SC/IM: CPT

## 2019-10-31 NOTE — PROGRESS NOTES
Outpatient Infusion Center Progress Note    1500 Pt admit to [facility] for Prolia injection ambulatory in stable condition. Assessment completed. No new concerns voiced. Labs completed earlier this month and all levels are appropriate for treatment. Patient given injection. Visit Vitals  /84 (BP 1 Location: Left arm, BP Patient Position: At rest)   Pulse 84   Temp 98.5 °F (36.9 °C)   Resp 18   SpO2 100%   Breastfeeding? No         1515  Pt tolerated treatment well. D/c home ambulatory in no distress.

## 2019-11-01 ENCOUNTER — PATIENT OUTREACH (OUTPATIENT)
Dept: FAMILY MEDICINE CLINIC | Age: 84
End: 2019-11-01

## 2019-11-01 DIAGNOSIS — R26.2 DIFFICULTY WALKING: ICD-10-CM

## 2019-11-01 DIAGNOSIS — F02.80 LATE ONSET ALZHEIMER'S DISEASE WITHOUT BEHAVIORAL DISTURBANCE (HCC): ICD-10-CM

## 2019-11-01 DIAGNOSIS — I10 HYPERTENSION, UNCONTROLLED: Primary | ICD-10-CM

## 2019-11-01 DIAGNOSIS — G45.9 TIA (TRANSIENT ISCHEMIC ATTACK): ICD-10-CM

## 2019-11-01 DIAGNOSIS — G30.1 LATE ONSET ALZHEIMER'S DISEASE WITHOUT BEHAVIORAL DISTURBANCE (HCC): ICD-10-CM

## 2019-11-01 NOTE — PROGRESS NOTES
Ambulatory Care Management Note      Date/Time:  11/4/2019 10:35 AM    This patient was received as a referral from [address] [place] [address] reviewed with the provider   Neurosurgery follow-up on 10/24/19 with Arnold Fonseca NP, small carotid aneurysm  · Approach to open-microsurgical treatment of aneuryem discussed with patient and family- who felt risk of procedure was too great. · Need new referral for [facility]- OT/PT/Aide, message to PCP, sent before and was released. Daughter requesting re-evaluate. · Fall and safety precautions addressed  · Consider repeat labs  · Was found to have bilateral carotid stenosis, recommended repeat doppler in 6 mo  · BP on 10/31//84-denosumab (PROLIA) injection 60 mg given  · Daughter to keep BP log and obtain Cuff-need script sent to her pharmacy  Results for Liliana Artis (MRN [id number]) as of 11/4/2019 11:10    10/15/2019 11:31   HGB  9.1 (L)   HCT  29.7 (L)   MCV  73 (L)   MCH  22.5 (L)   MCHC  30.6 (L)   RDW  15.3     10/15/2019 11:31   BUN  28 (H)   Creatinine  1.14 (H)   No ACP on file       Ambulatory  contacted patient for discussion and case managements of History of CKD, HTN, dementia, CAD, TIA, stenosis, and osteoporosis   Summary of patients top problems:   1. Cerebral aneurysm-Seen by Neuro surgery on 10/24/19 to discuss the finding of a left PCOM aneurysm- recently discharged from [facility] after having  Full work up for TIA. Family reports dementia is significant and patient unlikely to understand what is being discussed. Patient and family felt surgery was too risky at this time. 2. Anemia-Taking low dose ASA, Anemia, chronic disease   3. Dementia/TIA-was hospitalized at [facility] 9/27-9/29 for TIA (had facial droop and syncope, recovered quickly). Unsteady gait, history of falls. Daughter requesting re-evaluation by PT/OT.   Patient's challenges to self management identified:   Increasing dementia according to family, need assistance with mobility and ADLs. Medication Management:  Good understanding, good adherence according to daughter    Advance Care Planning:   Does patient have an Advance Directive:  not on file, information given to daughter by provider, told to contact ACM for completion and further discussion. ACM contact information given. [employer], Referrals, and Durable Medical Equipment: none    PCP/Specialist follow up:   Future Appointments   Date Time Provider Cory Vazquez   4/30/2020  3:00 PM [facility]           Goals      Initiate and reinforce education of the patient/family about management of disease and lifestyle changes. 11/1/19  · Educated daughter on importance of monitoring Blood pressures and keeping a log. · Control high blood pressure. High blood pressure increases the chance of an aneurysm bursting. A healthy lifestyle along with medicines may help you lower your blood pressure. Last /84  · Daughter asked to bring BP cuff in for instructions on using if questions. · Message to PCP to send Script to pharmacy for a cuff  · Manage cholesterol to help keep your blood vessels healthy. A healthy lifestyle along with medicines may help you manage cholesterol. · Stay at a healthy weight. Being overweight may not make the aneurysm any worse. But being at a healthy weight can reduce your risks from surgery if you need it. · Eat heart-healthy foods. These include fruits, vegetables, whole grains, fish, and low-fat or nonfat dairy foods. Limit sodium, alcohol, and sweets. · If your doctor recommends it, get more exercise. Walking is a good choice. Bit by bit, increase the amount you walk every day. · Keep follow up appointments as scheduled  · Asked daughter about scheduling a follow-up for ACP discussion  · ACM will follow up in 7-10 days, pk                Patient verbalized understanding of all information discussed.  Patient has this Nurse Navigators contact information for any further questions, concerns, or needs.

## 2019-11-04 ENCOUNTER — TELEPHONE (OUTPATIENT)
Dept: FAMILY MEDICINE CLINIC | Age: 84
End: 2019-11-04

## 2019-11-04 ENCOUNTER — HOME HEALTH ADMISSION (OUTPATIENT)
Dept: HOME HEALTH SERVICES | Facility: HOME HEALTH | Age: 84
End: 2019-11-04
Payer: MEDICARE

## 2019-11-04 RX ORDER — ACETAMINOPHEN 500 MG
TABLET ORAL
Qty: 1 KIT | Refills: 0 | Status: SHIPPED | OUTPATIENT
Start: 2019-11-04

## 2019-11-04 NOTE — TELEPHONE ENCOUNTER
Janell from [facility] is calling stating that they have received a referral for the pt to receive Occupational therapy. Janell wanted to inform Dr. Ronnie Crews,  OT specialist  will return back on 11/10/19.         Best callback:  [phone]      LOV: Tuesday, October 15, 2019

## 2019-11-06 ENCOUNTER — HOME CARE VISIT (OUTPATIENT)
Dept: SCHEDULING | Facility: HOME HEALTH | Age: 84
End: 2019-11-06
Payer: MEDICARE

## 2019-11-06 PROCEDURE — 3331090002 HH PPS REVENUE DEBIT

## 2019-11-06 PROCEDURE — 400013 HH SOC

## 2019-11-06 PROCEDURE — G0151 HHCP-SERV OF PT,EA 15 MIN: HCPCS

## 2019-11-06 PROCEDURE — 3331090003 HH PPS REVENUE ADJ

## 2019-11-06 PROCEDURE — 3331090001 HH PPS REVENUE CREDIT

## 2019-11-07 VITALS
SYSTOLIC BLOOD PRESSURE: 128 MMHG | HEART RATE: 70 BPM | OXYGEN SATURATION: 98 % | RESPIRATION RATE: 16 BRPM | TEMPERATURE: 98 F | DIASTOLIC BLOOD PRESSURE: 70 MMHG

## 2019-11-22 ENCOUNTER — PATIENT OUTREACH (OUTPATIENT)
Dept: INTERNAL MEDICINE CLINIC | Age: 84
End: 2019-11-22

## 2019-12-18 ENCOUNTER — PATIENT OUTREACH (OUTPATIENT)
Dept: FAMILY MEDICINE CLINIC | Age: 84
End: 2019-12-18

## 2019-12-18 NOTE — PROGRESS NOTES
AC Follow-up assessment:  Outbound call made to patient today to complete AC follow up assessment. Patient verified by 3 identifiers. Patient has follow-up scheduled with PCP on 1/23/20 Follow-up appointments reviewed, and medication reconciliation completed. NN contact information given for questions and concerns. Current Outpatient Medications   Medication Sig    metoprolol tartrate (LOPRESSOR) 25 mg tablet TAKE 1 TABLET BY MOUTH EVERY MORNING AND 2 TABLETS BY MOUTH EVERY EVENING    Blood Pressure Monitor kit Use daily for monitoring of HTN I10    irbesartan-hydroCHLOROthiazide (AVALIDE) 150-12.5 mg per tablet TAKE 1 TABLET BY MOUTH EVERY DAY    levothyroxine (SYNTHROID) 88 mcg tablet Take 1 Tab by mouth Daily (before breakfast).  atorvastatin (LIPITOR) 40 mg tablet Take 1 Tab by mouth nightly.  acetaminophen (TYLENOL) 325 mg tablet Take 2 Tabs by mouth every six (6) hours as needed for Pain.  aspirin delayed-release (ASPIR-81) 81 mg tablet Take 81 mg by mouth daily. No current facility-administered medications for this visit. Goals Addressed                 This Visit's Progress     Initiate and reinforce education of the patient/family about management of disease and lifestyle changes. 11/1/19  · Educated daughter on importance of monitoring Blood pressures and keeping a log. · Control high blood pressure. High blood pressure increases the chance of an aneurysm bursting. A healthy lifestyle along with medicines may help you lower your blood pressure. Last /84  · Daughter asked to bring BP cuff in for instructions on using if questions. · Message to PCP to send Script to pharmacy for a cuff  · Manage cholesterol to help keep your blood vessels healthy. A healthy lifestyle along with medicines may help you manage cholesterol. · Stay at a healthy weight. Being overweight may not make the aneurysm any worse.  But being at a healthy weight can reduce your risks from surgery if you need it. · Eat heart-healthy foods. These include fruits, vegetables, whole grains, fish, and low-fat or nonfat dairy foods. Limit sodium, alcohol, and sweets. · If your doctor recommends it, get more exercise. Walking is a good choice. Bit by bit, increase the amount you walk every day. · Keep follow up appointments as scheduled  · Asked daughter about scheduling a follow-up for ACP discussion  · ACM will follow up in 7-10 days, pk     12/18/19   · Outreach to patient to complete ACM assessment for chronic disease management-diabetes, HTN, CRF, able to talk with daughter Braden Drake)  · Daughter verbalized understanding of why patient was referred to care management services  · ACM explored barriers that might be impacting patient's ability to participate in care management  · Daughter verbalized that she has monitor and is able to check blood pressures, today = 138/74. · Admits to taking medications as ordered, ACM reviewed each medication, dosage, timing, route and number of times takes in last week  · Admits to sticking to low sodium diet and eating foods  · Addressed discrepancies in administration of medications with daughter  · Denies financial, social or behavioral barriers  · Denies need of information to manage chronic condition  · Daughter to take and record BP, daily for next review in 7-10 days  · Need a PCP follow up on around 4/15/19,daughter will call to schedule  · Daughter will log BPs,  for ACM assessment in 7 days. · Given ACM contact information, will follow in 7-10 days.  pk

## 2020-01-03 ENCOUNTER — PATIENT OUTREACH (OUTPATIENT)
Dept: FAMILY MEDICINE CLINIC | Age: 85
End: 2020-01-03

## 2020-01-15 NOTE — PROGRESS NOTES
AC Follow-up assessment: 1/3/20  Outbound call made to patient today to complete [facility] follow up assessment. Patient verified by 3 identifiers. Patient has follow-up scheduled with PCP on 1/23/20 Follow-up appointments reviewed, and medication reconciliation completed. NN contact information given for questions and concerns. Current Outpatient Medications   Medication Sig    metoprolol tartrate (LOPRESSOR) 25 mg tablet TAKE 1 TABLET BY MOUTH EVERY MORNING AND 2 TABLETS BY MOUTH EVERY EVENING    Blood Pressure Monitor kit Use daily for monitoring of HTN I10    irbesartan-hydroCHLOROthiazide (AVALIDE) 150-12.5 mg per tablet TAKE 1 TABLET BY MOUTH EVERY DAY    levothyroxine (SYNTHROID) 88 mcg tablet Take 1 Tab by mouth Daily (before breakfast).  atorvastatin (LIPITOR) 40 mg tablet Take 1 Tab by mouth nightly.  acetaminophen (TYLENOL) 325 mg tablet Take 2 Tabs by mouth every six (6) hours as needed for Pain.  aspirin delayed-release (ASPIR-81) 81 mg tablet Take 81 mg by mouth daily. No current facility-administered medications for this visit. Goals Addressed                 This Visit's Progress     Initiate and reinforce education of the patient/family about management of disease and lifestyle changes. 11/1/19  · Educated daughter on importance of monitoring Blood pressures and keeping a log. · Control high blood pressure. High blood pressure increases the chance of an aneurysm bursting. A healthy lifestyle along with medicines may help you lower your blood pressure. Last /84  · Daughter asked to bring BP cuff in for instructions on using if questions. · Message to PCP to send Script to pharmacy for a cuff  · Manage cholesterol to help keep your blood vessels healthy. A healthy lifestyle along with medicines may help you manage cholesterol. · Stay at a healthy weight. Being overweight may not make the aneurysm any worse.  But being at a healthy weight can reduce your risks from surgery if you need it. · Eat heart-healthy foods. These include fruits, vegetables, whole grains, fish, and low-fat or nonfat dairy foods. Limit sodium, alcohol, and sweets. · If your doctor recommends it, get more exercise. Walking is a good choice. Bit by bit, increase the amount you walk every day. · Keep follow up appointments as scheduled  · Asked daughter about scheduling a follow-up for ACP discussion  · ACM will follow up in 7-10 days, pk     12/18/19   · Outreach to patient to complete ACM assessment for chronic disease management-diabetes, HTN, CRF, able to talk with daughter Sarah Guidry)  · Daughter verbalized understanding of why patient was referred to care management services  · ACM explored if barriers that might be impacting patient's ability to participate in care management- none mentioned. · Daughter verbalized that she has monitor and is able to check blood pressures, today = 138/74. · Admits to taking medications as ordered, ACM reviewed each medication, dosage, timing, route and number of times takes in last week  · Admits to sticking to low sodium diet and eating foods  · Addressed discrepancies in administration of medications with daughter  · Denies financial, social or behavioral barriers  · Denies need of information to manage chronic condition  · Daughter to take and record BP, daily for next review in 7-10 days  · Need a PCP follow up on around 4/15/19,daughter will call to schedule  · Daughter will log BPs,  for ACM assessment in 7 days. · Given ACM contact information, will follow in 7-10 days. pk    01/03/2020    · Outreach to daughter to complete ACM assessment for chronic disease management-HTN, CHF and patient safety- was able to speak with daughterAlin. Denies   · Daughter verbalized understanding of why referred to care management services  · ACM explored if barriers that might be impacting patient's ability to participate in care management- none mentioned.   · Admits to taking medications as ordered, ACM reviewed each medication, dosage, timing, route and number of times takes in last week, daughter has been keeping BP log. BP today-142/66. · Addressed discrepancies in administration with patient  · Denies financial, social or behavioral barriers, patient has early dementia  · Ask about ACP document, still has not complete due to mother's memory issues. · Daughter denies red flags for stroke, still has weakness. · Plan to attend PCP appointment on 1/23/2020. Plan for Next Visit:  1. Assess compliance with medications and understanding of chronic conditions  2. Assess understanding of plan of care and health-seeking behavior changes  3.  Assess need for education and barriers to care  Evaluation:  ACM To follow up in seven-ten days   Patient given contact information for ACM, and office number for twenty-four hour on call coverage ([phone])HE

## 2020-01-17 ENCOUNTER — PATIENT OUTREACH (OUTPATIENT)
Dept: FAMILY MEDICINE CLINIC | Age: 85
End: 2020-01-17

## 2020-01-17 NOTE — PROGRESS NOTES
[facility] Follow-up assessment:  Outbound call made to patient today, [facility] was able to speak with Vito Hawk to complete [facility] follow up assessment. Patient verified by 3 identifiers. Patient has follow-up scheduled with PCP on 1/23/20. Follow-up appointments reviewed, and medication reconciliation completed. NN contact information given for questions and concerns. Current Outpatient Medications   Medication Sig    metoprolol tartrate (LOPRESSOR) 25 mg tablet TAKE 1 TABLET BY MOUTH EVERY MORNING AND 2 TABLETS BY MOUTH EVERY EVENING    Blood Pressure Monitor kit Use daily for monitoring of HTN I10    irbesartan-hydroCHLOROthiazide (AVALIDE) 150-12.5 mg per tablet TAKE 1 TABLET BY MOUTH EVERY DAY    levothyroxine (SYNTHROID) 88 mcg tablet Take 1 Tab by mouth Daily (before breakfast).  atorvastatin (LIPITOR) 40 mg tablet Take 1 Tab by mouth nightly.  acetaminophen (TYLENOL) 325 mg tablet Take 2 Tabs by mouth every six (6) hours as needed for Pain.  aspirin delayed-release (ASPIR-81) 81 mg tablet Take 81 mg by mouth daily. No current facility-administered medications for this visit. Goals Addressed                 This Visit's Progress     Initiate and reinforce education of the patient/family about management of disease and lifestyle changes. 11/1/19  · Educated daughter on importance of monitoring Blood pressures and keeping a log. · Control high blood pressure. High blood pressure increases the chance of an aneurysm bursting. A healthy lifestyle along with medicines may help you lower your blood pressure. Last /84  · Daughter asked to bring BP cuff in for instructions on using if questions. · Message to PCP to send Script to pharmacy for a cuff  · Manage cholesterol to help keep your blood vessels healthy. A healthy lifestyle along with medicines may help you manage cholesterol. · Stay at a healthy weight. Being overweight may not make the aneurysm any worse.  But being at a healthy weight can reduce your risks from surgery if you need it. · Eat heart-healthy foods. These include fruits, vegetables, whole grains, fish, and low-fat or nonfat dairy foods. Limit sodium, alcohol, and sweets. · If your doctor recommends it, get more exercise. Walking is a good choice. Bit by bit, increase the amount you walk every day. · Keep follow up appointments as scheduled  · Asked daughter about scheduling a follow-up for ACP discussion  · ACM will follow up in 7-10 days, pk     12/18/19   · Outreach to patient to complete ACM assessment for chronic disease management-diabetes, HTN, CRF, able to talk with daughter Oniel Mello)  · Daughter verbalized understanding of why patient was referred to care management services  · ACM explored if barriers that might be impacting patient's ability to participate in care management- none mentioned. · Daughter verbalized that she has monitor and is able to check blood pressures, today = 138/74. · Admits to taking medications as ordered, ACM reviewed each medication, dosage, timing, route and number of times takes in last week  · Admits to sticking to low sodium diet and eating foods  · Addressed discrepancies in administration of medications with daughter  · Denies financial, social or behavioral barriers  · Denies need of information to manage chronic condition  · Daughter to take and record BP, daily for next review in 7-10 days  · Need a PCP follow up on around 4/15/19,daughter will call to schedule  · Daughter will log BPs,  for ACM assessment in 7 days. · Given ACM contact information, will follow in 7-10 days. pk    01/03/2020    · Outreach to daughter to complete ACM assessment for chronic disease management-HTN, CHF and patient safety- was able to speak with daughter-Shaunna.  Denies   · Daughter verbalized understanding of why referred to care management services  · ACM explored if barriers that might be impacting patient's ability to participate in care management- none mentioned. · Admits to taking medications as ordered, ACM reviewed each medication, dosage, timing, route and number of times takes in last week, daughter has been keeping BP log. BP today-142/66. · Addressed discrepancies in administration with patient  · Denies financial, social or behavioral barriers, patient has early dementia  · Ask about ACP document, still has not complete due to mother's memory issues. · Daughter denies red flags for stroke, still has weakness. · Plan to attend PCP appointment on 1/23/2020. Plan for Next Visit:  1. Assess compliance with medications and understanding of chronic conditions  2. Assess understanding of plan of care and health-seeking behavior changes  3. Assess need for education and barriers to care  Evaluation:  ACM To follow up in seven-ten days   Patient given contact information for ACM, and office number for twenty-four hour on call coverage ([phone])YX    01/17/20   · Outreach to patient to complete ACM assessment, was able to speak with Pretty Rocha. · According to Pretty Rocha, mother is still losing weight. She will bring her in as fasting to have labs done. Patient not eating good. · BP being monitored 120/62-today  · Patient still gets around pretty good, denies need for supportive equipment  · Denies need for [facility] or [facility]  · Doing better at taking medications, has to be given all meds- has pill box. · Admits to taking medications as ordered with help, ACM reviewed each medication, dosage, timing, route and number of times taken daily  · Addressed discrepancies in administration with patient  · Denies financial, social or behavioral barriers  · ACM contact information given, will follow in 7-10 days.  pk  ·

## 2020-01-22 NOTE — PROGRESS NOTES
[facility]  [place], [address]  [phone]             Date of visit: 1/23/2020   Subjective:      History obtained from:  caregiver. Marium Beard is a 82 y.o. female who presents today for just not eating  Eats breakfast with protein shake in it instead but not much else  They have tried encouraging her with a lot of foods    Has had some diarrhea  Had that on Monday, 3 days ago  Had diarrhea on and off in Dec several times  Never bloody  Denies vomiting, denies vomiting    Dementia moderate to severe but usually cheerful, no behavioral problems  Just sleeping more  Getting in the bed instead of sleeping in the chair  This causes her to miss her evening lipitor and metoprolol at times    Weight when I saw her 3 months ago was 96lb, down 10lb to 86lb    She had the TIA in Sept with a finding of cerebral aneurysm and bilateral carotid stenosis, followed up with Dr. Anibal Ray, neurointerventionalist a couple of times and elected not to have a procedure done as risks outweighed benefits    Had her first prolia injection in October     Blood pressure good at home, 120s    Patient Active Problem List    Diagnosis Date Noted    Cerebral aneurysm without rupture 09/29/2019    TIA (transient ischemic attack) 09/28/2019    Syncope 09/27/2019    Age-related osteoporosis without current pathological fracture 08/01/2017    Alzheimer's dementia ([facility] [address] 07/05/2017    Carotid stenosis, bilateral 07/05/2017    Glucose intolerance (impaired glucose tolerance) 04/10/2015    Hypothyroidism 01/31/2013    Anemia, chronic disease 01/31/2013    Chronic renal insufficiency, stage III (moderate) ([address] 01/31/2013    Essential hypertension 01/31/2013    Kyphosis (acquired) (postural) 01/31/2013     Current Outpatient Medications   Medication Sig Dispense Refill    atorvastatin (LIPITOR) 40 mg tablet Take 1 Tab by mouth daily.       metoprolol succinate (TOPROL-XL) 50 mg XL tablet Take 1 Tab by mouth daily. (replaces previous metoprolol) 90 Tab 1    Blood Pressure Monitor kit Use daily for monitoring of HTN I10 1 Kit 0    irbesartan-hydroCHLOROthiazide (AVALIDE) 150-12.5 mg per tablet TAKE 1 TABLET BY MOUTH EVERY DAY 90 Tab 1    levothyroxine (SYNTHROID) 88 mcg tablet Take 1 Tab by mouth Daily (before breakfast). 90 Tab 3    acetaminophen (TYLENOL) 325 mg tablet Take 2 Tabs by mouth every six (6) hours as needed for Pain. 20 Tab 0    aspirin delayed-release (ASPIR-81) 81 mg tablet Take 81 mg by mouth daily. Allergies   Allergen Reactions    Fosamax [Alendronate] Other (comments)     Bones ached     Past Medical History:   Diagnosis Date    Anemia of chronic disease 11/03    [place]-hem    Carotid artery disease ([facility]     repeat dopplers 3/14    Dehydration     Dementia ([facility]     Goiter     s/p thyroidectomy, benign    Lumbar disc disease     ruptured disc L4-L5 s/p epidural  [place]    Osteoporosis, unspecified 12/03    Pure hypercholesterolemia 11/03    TIA (transient ischemic attack)     Unspecified essential hypertension      Past Surgical History:   Procedure Laterality Date    HX COLONOSCOPY      PUCT/ASPIR BREAST CYST,EACH ADDN  2/04    right breast    THYROIDECTOMY  5/10    benign goiter  Brown/surg     Family History   Problem Relation Age of Onset    Hypertension Mother     Hypertension Father      Social History     Tobacco Use    Smoking status: Never Smoker    Smokeless tobacco: Never Used   Substance Use Topics    Alcohol use: No      Social History     Patient does not qualify to have social determinant information on file (likely too young).    Social History Narrative    Lives with her daughter's family            Review of Systems  Gen: denies fever  Pulm: denies cough   denies urinary pain      Objective:     Vitals:    01/23/20 0808   BP: 150/51   Pulse: (!) 55   Resp: 17   Temp: 97.9 °F (36.6 °C)   TempSrc: Oral   SpO2: 100%   Weight: 86 lb 9.6 oz (39.3 kg)   Height: 5' (1.524 m)     Body mass index is 16.91 kg/m². General: stated age, very thin and in NAD  Neck: supple, symmetrical, trachea midline, no adenopathy and thyroid: not enlarged, symmetric, no tenderness/mass/nodules  Lungs:  clear to auscultation w/o rales, rhonchi, wheezes w/normal effort and no use of accessory muscles of respiration   Heart: regular rate and rhythm, S1, S2 normal, no murmur, click, rub or gallop  Abdomen: soft, nontender, no masses  Ext:  No edema noted. Lymph: no cervical adenopathy appreciated  Skin:  Normal. and no rash or abnormalities   Psych: alert, smiling, calm, answers some questions yes/no       Assessment/Plan:       ICD-10-CM ICD-9-CM    1. Weight loss R63.4 783.21 CBC WITH AUTOMATED DIFF      METABOLIC PANEL, COMPREHENSIVE   2. Late onset Alzheimer's disease without behavioral disturbance (HCC) G30.1 331.0 CULTURE, URINE    F02.80 294.10 URINALYSIS W/ RFLX MICROSCOPIC   3. TIA (transient ischemic attack) G45.9 435.9    4. Chronic renal insufficiency, stage III (moderate) (HCC) N18.3 585.3    5. Age-related osteoporosis without current pathological fracture M81.0 733.01    6. Anemia, chronic disease D63.8 285.29    7. Cerebral aneurysm without rupture I67.1 437.3    8. Essential hypertension I10 401.9    9. Decreased appetite R63.0 783.0 CULTURE, URINE      URINALYSIS W/ RFLX MICROSCOPIC   10.  Acquired hypothyroidism E03.9 244.9 TSH 3RD GENERATION        Orders Placed This Encounter    CULTURE, URINE    URINALYSIS W/ RFLX MICROSCOPIC    CBC WITH AUTOMATED DIFF    METABOLIC PANEL, COMPREHENSIVE    TSH 3RD GENERATION    atorvastatin (LIPITOR) 40 mg tablet    metoprolol succinate (TOPROL-XL) 50 mg XL tablet       Severely underweight and down 10lb in 3 mo  Suspect just due to dementia, but will do labs as above and call Sat about results and plan  If no findings might suggest hospice  CT scan may be a helpful option  Discussed risks of endoscopy and not really having many GI symptoms other than not wanting to eat and occasional diarrhea  They agree she would not want procedures    Discussed the diagnosis and plan and she expressed understanding. Follow-up and Dispositions    · Return in about 3 months (around 4/23/2020).          Zee Horton MD

## 2020-01-22 NOTE — PATIENT INSTRUCTIONS
Helping a Person With Alzheimer's Disease: Care Instructions Your Care Instructions Alzheimer's disease is a type of dementia. It affects memory, intelligence, judgment, language, and behavior. It is not clear what causes this disease. But it is the most common form of dementia in older adults. It may take many years to develop. Alzheimer's disease is different than mild memory loss that occurs with aging. Family members usually notice symptoms first. But the person also may realize that something is wrong. Follow-up care is a key part of your loved one's treatment and safety. Be sure to make and go to all appointments, and call your doctor if your loved one is having problems. It's also a good idea to know your loved one's test results and keep a list of the medicines he or she takes. How can you care for your loved one at home? · Develop a routine. The person will feel less frustrated or confused with a clear, simple daily plan. Remind him or her about important facts and events. · Be patient. It may take longer for the person to complete a task than it used to. · Help the person eat a balanced diet. Serve plenty of whole grains, fruits, and vegetables every day. If the person is not eating well at mealtimes, give snacks at midmorning and in the afternoon. Offer drinks such as Boost, Ensure, or Sustacal if he or she is losing weight. · Encourage exercise. Walking and other activity may slow the decline of mental ability. Help the person keep an active mind. Encourage hobbies such as reading and crossword puzzles. · Take steps to help if the person is sundowning. This is the restless behavior and trouble with sleeping that may occur in late afternoon and at night. Try not to let the person nap during the day. Offer a glass of warm milk or caffeine-free tea before bedtime. · Ask family members and friends for help. You may need breaks where others can help care for the person. · Talk to the person's doctor about what resources are available for help in your area. · Review all of the person's medicines with his or her doctor. · For as long as the person is able, allow him or her to make decisions about activities, food, clothing, and other choices. Let the person be independent, even if tasks take more time or are not done perfectly. Tailor tasks to the person's abilities. For example, if cooking is no longer safe, ask for other help. He or she can help set the table or make simple dishes such as a salad. When the person needs help, offer it gently. Keeping safe · Make your home (or the person's home) safe. Tack down rugs, and put no-slip tape in the tub. Install handrails, and put safety switches on stoves and appliances. Keep rooms free of clutter. Make sure walkways around furniture are clear. Do not move furniture around, because the person may become confused. · Use locks on doors and cupboards. Lock up knives, scissors, medicines, cleaning supplies, and other dangerous things. · Do not let the person drive or cook if he or she cannot do it safely. A person with Alzheimer's should not drive unless he or she is able to pass an on-road driving test. Your state 's license bureau can do a driving test if there is any question. · Get medical alert jewelry for the person so you can be contacted if he or she wanders away. If possible, provide a safe place for wandering, such as an enclosed yard or garden. When should you call for help? Call 911 anytime you think you may need emergency care.  For example, call if: 
  · A person who has Alzheimer's disease has disappeared.  
  · A person who has Alzheimer's disease is seriously injured.  
[facility] your doctor now or seek immediate medical care if: 
  · The person you are caring for suddenly sees or hears things that are not there (hallucinates).  
  · The person you are caring for has a sudden, drastic change in his or her behavior.  
 Watch closely for changes in your loved one's health, and be sure to contact the doctor if: 
  · A person who has Alzheimer's disease gradually gets worse or has symptoms that could cause injury.  
  · You need help caring for a person with Alzheimer's disease.  
  · The person has problems with his or her medicine. Where can you learn more? Go to http://rocael-chyna.info/. Enter H243 in the search box to learn more about \"Helping a Person With Alzheimer's Disease: Care Instructions. \" Current as of: May 28, 2019 Content Version: 12.2 © 8470-6709 [employer]. Care instructions adapted under license by [employer] (which disclaims liability or warranty for this information). If you have questions about a medical condition or this instruction, always ask your healthcare professional. [address] any warranty or liability for your use of this information.

## 2020-01-23 ENCOUNTER — OFFICE VISIT (OUTPATIENT)
Dept: FAMILY MEDICINE CLINIC | Age: 85
End: 2020-01-23

## 2020-01-23 VITALS
BODY MASS INDEX: 17 KG/M2 | HEART RATE: 55 BPM | RESPIRATION RATE: 17 BRPM | OXYGEN SATURATION: 100 % | HEIGHT: 60 IN | TEMPERATURE: 97.9 F | SYSTOLIC BLOOD PRESSURE: 150 MMHG | DIASTOLIC BLOOD PRESSURE: 51 MMHG | WEIGHT: 86.6 LBS

## 2020-01-23 DIAGNOSIS — M81.0 AGE-RELATED OSTEOPOROSIS WITHOUT CURRENT PATHOLOGICAL FRACTURE: ICD-10-CM

## 2020-01-23 DIAGNOSIS — N18.30 CHRONIC RENAL INSUFFICIENCY, STAGE III (MODERATE) (HCC): ICD-10-CM

## 2020-01-23 DIAGNOSIS — I67.1 CEREBRAL ANEURYSM WITHOUT RUPTURE: ICD-10-CM

## 2020-01-23 DIAGNOSIS — G30.1 LATE ONSET ALZHEIMER'S DISEASE WITHOUT BEHAVIORAL DISTURBANCE (HCC): ICD-10-CM

## 2020-01-23 DIAGNOSIS — D63.8 ANEMIA, CHRONIC DISEASE: ICD-10-CM

## 2020-01-23 DIAGNOSIS — E03.9 ACQUIRED HYPOTHYROIDISM: ICD-10-CM

## 2020-01-23 DIAGNOSIS — R63.4 WEIGHT LOSS: Primary | ICD-10-CM

## 2020-01-23 DIAGNOSIS — G45.9 TIA (TRANSIENT ISCHEMIC ATTACK): ICD-10-CM

## 2020-01-23 DIAGNOSIS — I10 ESSENTIAL HYPERTENSION: ICD-10-CM

## 2020-01-23 DIAGNOSIS — F02.80 LATE ONSET ALZHEIMER'S DISEASE WITHOUT BEHAVIORAL DISTURBANCE (HCC): ICD-10-CM

## 2020-01-23 DIAGNOSIS — R63.0 DECREASED APPETITE: ICD-10-CM

## 2020-01-23 RX ORDER — METOPROLOL SUCCINATE 50 MG/1
50 TABLET, EXTENDED RELEASE ORAL DAILY
Qty: 90 TAB | Refills: 1 | Status: SHIPPED | OUTPATIENT
Start: 2020-01-23 | End: 2020-08-03

## 2020-01-23 RX ORDER — ATORVASTATIN CALCIUM 40 MG/1
40 TABLET, FILM COATED ORAL DAILY
COMMUNITY
Start: 2020-01-23 | End: 2020-08-03

## 2020-01-23 NOTE — PROGRESS NOTES
Chief Complaint   Patient presents with    Dementia    Weight Loss     patients daughter states she has been losing weight        1. Have you been to the ER, urgent care clinic since your last visit? Hospitalized since your last visit? No    2. Have you seen or consulted any other health care providers outside of the [address] [place] [address] since your last visit? Include any pap smears or colon screening.  No

## 2020-01-25 LAB
APPEARANCE UR: CLEAR
BACTERIA #/AREA URNS HPF: NORMAL /[HPF]
BACTERIA UR CULT: ABNORMAL
BILIRUB UR QL STRIP: NEGATIVE
CASTS URNS QL MICRO: NORMAL /LPF
COLOR UR: YELLOW
EPI CELLS #/AREA URNS HPF: NORMAL /HPF (ref 0–10)
GLUCOSE UR QL: NEGATIVE
HGB UR QL STRIP: NEGATIVE
KETONES UR QL STRIP: NEGATIVE
LEUKOCYTE ESTERASE UR QL STRIP: ABNORMAL
MICRO URNS: ABNORMAL
MUCOUS THREADS URNS QL MICRO: PRESENT
NITRITE UR QL STRIP: NEGATIVE
PH UR STRIP: 6 [PH] (ref 5–7.5)
PROT UR QL STRIP: NEGATIVE
RBC #/AREA URNS HPF: NORMAL /HPF (ref 0–2)
SP GR UR: 1.01 (ref 1–1.03)
UROBILINOGEN UR STRIP-MCNC: 0.2 MG/DL (ref 0.2–1)
WBC #/AREA URNS HPF: NORMAL /HPF (ref 0–5)

## 2020-01-28 LAB
ALBUMIN SERPL-MCNC: NORMAL G/DL
ALP SERPL-CCNC: NORMAL U/L
ALT SERPL-CCNC: NORMAL U/L
AST SERPL-CCNC: NORMAL U/L
BACTERIA UR CULT: NORMAL
BASOPHILS # BLD AUTO: 0 X10E3/UL (ref 0–0.2)
BASOPHILS NFR BLD AUTO: 1 %
BILIRUB SERPL-MCNC: NORMAL MG/DL
BUN SERPL-MCNC: NORMAL MG/DL
CALCIUM SERPL-MCNC: NORMAL MG/DL
CHLORIDE SERPL-SCNC: NORMAL MMOL/L
CO2 SERPL-SCNC: NORMAL MMOL/L
CREAT SERPL-MCNC: NORMAL MG/DL
EOSINOPHIL # BLD AUTO: 0.1 X10E3/UL (ref 0–0.4)
EOSINOPHIL NFR BLD AUTO: 1 %
ERYTHROCYTE [DISTWIDTH] IN BLOOD BY AUTOMATED COUNT: 15.3 % (ref 11.7–15.4)
GLUCOSE SERPL-MCNC: NORMAL MG/DL
HCT VFR BLD AUTO: 29.7 % (ref 34–46.6)
HGB BLD-MCNC: 9.1 G/DL (ref 11.1–15.9)
IMM GRANULOCYTES # BLD AUTO: 0 X10E3/UL (ref 0–0.1)
IMM GRANULOCYTES NFR BLD AUTO: 0 %
LYMPHOCYTES # BLD AUTO: 1.1 X10E3/UL (ref 0.7–3.1)
LYMPHOCYTES NFR BLD AUTO: 30 %
MCH RBC QN AUTO: 22.4 PG (ref 26.6–33)
MCHC RBC AUTO-ENTMCNC: 30.6 G/DL (ref 31.5–35.7)
MCV RBC AUTO: 73 FL (ref 79–97)
MONOCYTES # BLD AUTO: 0.4 X10E3/UL (ref 0.1–0.9)
MONOCYTES NFR BLD AUTO: 10 %
NEUTROPHILS # BLD AUTO: 2 X10E3/UL (ref 1.4–7)
NEUTROPHILS NFR BLD AUTO: 58 %
PLATELET # BLD AUTO: 318 X10E3/UL (ref 150–450)
POTASSIUM SERPL-SCNC: NORMAL MMOL/L
PROT SERPL-MCNC: NORMAL G/DL
RBC # BLD AUTO: 4.06 X10E6/UL (ref 3.77–5.28)
SODIUM SERPL-SCNC: NORMAL MMOL/L
TSH SERPL DL<=0.005 MIU/L-ACNC: 0.06 UIU/ML (ref 0.45–4.5)
WBC # BLD AUTO: 3.5 X10E3/UL (ref 3.4–10.8)

## 2020-01-29 RX ORDER — LEVOTHYROXINE SODIUM 75 UG/1
75 TABLET ORAL
Qty: 90 TAB | Refills: 1 | Status: SHIPPED | OUTPATIENT
Start: 2020-01-29 | End: 2020-05-15 | Stop reason: DRUGHIGH

## 2020-01-29 NOTE — PROGRESS NOTES
Please let them know urine and blood counts were ok but that thyroid needs to be decreased. I will call in 75mcg.  Also, they need to bring her back for CMP; not sure why, but it did not get done

## 2020-01-30 ENCOUNTER — TELEPHONE (OUTPATIENT)
Dept: FAMILY MEDICINE CLINIC | Age: 85
End: 2020-01-30

## 2020-01-30 DIAGNOSIS — R63.4 LOSS OF WEIGHT: Primary | ICD-10-CM

## 2020-01-30 NOTE — TELEPHONE ENCOUNTER
Please let them know urine and blood counts were ok but that thyroid needs to be decreased. I will call in 75mcg. Also, they need to bring her back for CMP; not sure why, but it did not get done    Called dtr and went over results. She states that she will bring her mom in on Sat for the labs. CMP reordered.

## 2020-01-30 NOTE — TELEPHONE ENCOUNTER
----- Message from Danielle Watkins sent at 1/30/2020  2:31 PM EST -----  Regarding: [place]/telephone  Pts daughter Tiffanie Dudley is requesting her mothers lab results. Daughters number is [phone].

## 2020-02-03 ENCOUNTER — HOSPITAL ENCOUNTER (OUTPATIENT)
Dept: LAB | Age: 85
Discharge: HOME | End: 2020-02-03
Payer: MEDICARE

## 2020-02-03 PROCEDURE — 36415 COLL VENOUS BLD VENIPUNCTURE: CPT

## 2020-02-03 PROCEDURE — 80053 COMPREHEN METABOLIC PANEL: CPT

## 2020-02-04 LAB
ALBUMIN SERPL-MCNC: 3.8 G/DL (ref 3.6–4.6)
ALBUMIN/GLOB SERPL: 1.2 {RATIO} (ref 1.2–2.2)
ALP SERPL-CCNC: 87 IU/L (ref 39–117)
ALT SERPL-CCNC: 18 IU/L (ref 0–32)
AST SERPL-CCNC: 27 IU/L (ref 0–40)
BILIRUB SERPL-MCNC: <0.2 MG/DL (ref 0–1.2)
BUN SERPL-MCNC: 24 MG/DL (ref 8–27)
BUN/CREAT SERPL: 22 (ref 12–28)
CALCIUM SERPL-MCNC: 8.2 MG/DL (ref 8.7–10.3)
CHLORIDE SERPL-SCNC: 96 MMOL/L (ref 96–106)
CO2 SERPL-SCNC: 24 MMOL/L (ref 20–29)
CREAT SERPL-MCNC: 1.07 MG/DL (ref 0.57–1)
GLOBULIN SER CALC-MCNC: 3.2 G/DL (ref 1.5–4.5)
GLUCOSE SERPL-MCNC: 52 MG/DL (ref 65–99)
INTERPRETATION: NORMAL
POTASSIUM SERPL-SCNC: 4.6 MMOL/L (ref 3.5–5.2)
PROT SERPL-MCNC: 7 G/DL (ref 6–8.5)
SODIUM SERPL-SCNC: 138 MMOL/L (ref 134–144)

## 2020-02-04 NOTE — TELEPHONE ENCOUNTER
Sent in letter:  Good news--kidney, liver, sugar, and electrolytes were fine. I am sorry you had to come back for another blood draw!

## 2020-02-06 NOTE — PROGRESS NOTES
Spoke to Rinku colunga she said that she had talked to someone last week and pt came in for CMP this week.

## 2020-02-11 ENCOUNTER — PATIENT OUTREACH (OUTPATIENT)
Dept: FAMILY MEDICINE CLINIC | Age: 85
End: 2020-02-11

## 2020-02-13 NOTE — PROGRESS NOTES
Goals Addressed                 This Visit's Progress     Initiate and reinforce education of the patient/family about management of disease and lifestyle changes. 11/1/19  · Educated daughter on importance of monitoring Blood pressures and keeping a log. · Control high blood pressure. High blood pressure increases the chance of an aneurysm bursting. A healthy lifestyle along with medicines may help you lower your blood pressure. Last /84  · Daughter asked to bring BP cuff in for instructions on using if questions. · Message to PCP to send Script to pharmacy for a cuff  · Manage cholesterol to help keep your blood vessels healthy. A healthy lifestyle along with medicines may help you manage cholesterol. · Stay at a healthy weight. Being overweight may not make the aneurysm any worse. But being at a healthy weight can reduce your risks from surgery if you need it. · Eat heart-healthy foods. These include fruits, vegetables, whole grains, fish, and low-fat or nonfat dairy foods. Limit sodium, alcohol, and sweets. · If your doctor recommends it, get more exercise. Walking is a good choice. Bit by bit, increase the amount you walk every day. · Keep follow up appointments as scheduled  · Asked daughter about scheduling a follow-up for ACP discussion  · ACM will follow up in 7-10 days, pk     12/18/19   · Outreach to patient to complete ACM assessment for chronic disease management-diabetes, HTN, CRF, able to talk with daughter Corina Asia)  · Daughter verbalized understanding of why patient was referred to care management services  · ACM explored if barriers that might be impacting patient's ability to participate in care management- none mentioned. · Daughter verbalized that she has monitor and is able to check blood pressures, today = 138/74.   · Admits to taking medications as ordered, ACM reviewed each medication, dosage, timing, route and number of times takes in last week  · Admits to sticking to low sodium diet and eating foods  · Addressed discrepancies in administration of medications with daughter  · Denies financial, social or behavioral barriers  · Denies need of information to manage chronic condition  · Daughter to take and record BP, daily for next review in 7-10 days  · Need a PCP follow up on around 4/15/19,daughter will call to schedule  · Daughter will log BPs,  for ACM assessment in 7 days. · Given ACM contact information, will follow in 7-10 days. pk    01/03/2020    · Outreach to daughter to complete ACM assessment for chronic disease management-HTN, CHF and patient safety- was able to speak with daughter-Shaunna. · Daughter verbalized understanding of why referred to care management services  · ACM explored if barriers that might be impacting patient's ability to participate in care management- none mentioned. · Admits to taking medications as ordered, ACM reviewed each medication, dosage, timing, route and number of times takes in last week, daughter has been keeping BP log. BP today-142/66. · Addressed discrepancies in administration with patient  · Denies financial, social or behavioral barriers, patient has early dementia  · Ask about ACP document, still has not complete due to mother's memory issues. · Daughter denies red flags for stroke, still has weakness. · Plan to attend PCP appointment on 1/23/2020. Plan for Next Visit:  1. Assess compliance with medications and understanding of chronic conditions  2. Assess understanding of plan of care and health-seeking behavior changes  3. Assess need for education and barriers to care  Evaluation:  ACM To follow up in seven-ten days   Patient given contact information for ACM, and office number for twenty-four hour on call coverage ([phone])WF    01/17/20   · Outreach to patient to complete ACM assessment, was able to speak with Vito Hawk. · According to Vito Hawk, mother is still losing weight.  She will bring her in as fasting to have labs done. Patient not eating good. · BP being monitored 120/62-today  · Patient still gets around pretty good, denies need for supportive equipment  · Denies need for [facility] or [facility]  · Doing better at taking medications, has to be given all meds- has pill box. · Admits to taking medications as ordered with help, ACM reviewed each medication, dosage, timing, route and number of times taken daily  · Addressed discrepancies in administration with patient  · Denies financial, social or behavioral barriers  · ACM contact information given, will follow in 7-10 days. pk    02/13/20  · Outreach to daughter to complete ACM assessment for chronic disease management-HTN, CHF and patient safety- was able to speak with daughter-Shaunna. · Daughter still voices concern about mom not eating good  · Taking protein milk shakes, but sometimes gives her diarrhea  · Has lost about 10 lbs since October  · Continues to get her Prolia injections for her osteoarthritis  · Admits to taking medications as ordered with help, ACM reviewed each medication, dosage, timing, route and number of times taken daily  · Addressed discrepancies in administration with patient  · Denies financial, social or behavioral barriers  · ACM will follow again in 10 days. pk          Current Outpatient Medications   Medication Sig    levothyroxine (SYNTHROID) 75 mcg tablet Take 1 Tab by mouth Daily (before breakfast). Lower dose    atorvastatin (LIPITOR) 40 mg tablet Take 1 Tab by mouth daily.  metoprolol succinate (TOPROL-XL) 50 mg XL tablet Take 1 Tab by mouth daily. (replaces previous metoprolol)    Blood Pressure Monitor kit Use daily for monitoring of HTN I10    irbesartan-hydroCHLOROthiazide (AVALIDE) 150-12.5 mg per tablet TAKE 1 TABLET BY MOUTH EVERY DAY    acetaminophen (TYLENOL) 325 mg tablet Take 2 Tabs by mouth every six (6) hours as needed for Pain.  aspirin delayed-release (ASPIR-81) 81 mg tablet Take 81 mg by mouth daily. No current facility-administered medications for this visit.

## 2020-03-25 ENCOUNTER — PATIENT OUTREACH (OUTPATIENT)
Dept: FAMILY MEDICINE CLINIC | Age: 85
End: 2020-03-25

## 2020-03-25 NOTE — PROGRESS NOTES
Patient has graduated from the Complex Case Management  program on 3/25/20. Patient/family has the ability to self-manage at this time Care management goals have been completed. No further Ambulatory Care Manager follow up scheduled. Goals Addressed                 This Visit's Progress     COMPLETED: Initiate and reinforce education of the patient/family about management of disease and lifestyle changes. 11/1/19  · Educated daughter on importance of monitoring Blood pressures and keeping a log. · Control high blood pressure. High blood pressure increases the chance of an aneurysm bursting. A healthy lifestyle along with medicines may help you lower your blood pressure. Last /84  · Daughter asked to bring BP cuff in for instructions on using if questions. · Message to PCP to send Script to pharmacy for a cuff  · Manage cholesterol to help keep your blood vessels healthy. A healthy lifestyle along with medicines may help you manage cholesterol. · Stay at a healthy weight. Being overweight may not make the aneurysm any worse. But being at a healthy weight can reduce your risks from surgery if you need it. · Eat heart-healthy foods. These include fruits, vegetables, whole grains, fish, and low-fat or nonfat dairy foods. Limit sodium, alcohol, and sweets. · If your doctor recommends it, get more exercise. Walking is a good choice. Bit by bit, increase the amount you walk every day.     · Keep follow up appointments as scheduled  · Asked daughter about scheduling a follow-up for ACP discussion  · ACM will follow up in 7-10 days, pk     12/18/19   · Outreach to patient to complete ACM assessment for chronic disease management-diabetes, HTN, CRF, able to talk with daughter Lawrence Aguilar)  · Daughter verbalized understanding of why patient was referred to care management services  · ACM explored if barriers that might be impacting patient's ability to participate in care management- none mentioned. · Daughter verbalized that she has monitor and is able to check blood pressures, today = 138/74. · Admits to taking medications as ordered, ACM reviewed each medication, dosage, timing, route and number of times takes in last week  · Admits to sticking to low sodium diet and eating foods  · Addressed discrepancies in administration of medications with daughter  · Denies financial, social or behavioral barriers  · Denies need of information to manage chronic condition  · Daughter to take and record BP, daily for next review in 7-10 days  · Need a PCP follow up on around 4/15/19,daughter will call to schedule  · Daughter will log BPs,  for ACM assessment in 7 days. · Given ACM contact information, will follow in 7-10 days. pk    01/03/2020    · Outreach to daughter to complete ACM assessment for chronic disease management-HTN, CHF and patient safety- was able to speak with daughter-Shaunna. · Daughter verbalized understanding of why referred to care management services  · ACM explored if barriers that might be impacting patient's ability to participate in care management- none mentioned. · Admits to taking medications as ordered, ACM reviewed each medication, dosage, timing, route and number of times takes in last week, daughter has been keeping BP log. BP today-142/66. · Addressed discrepancies in administration with patient  · Denies financial, social or behavioral barriers, patient has early dementia  · Ask about ACP document, still has not complete due to mother's memory issues. · Daughter denies red flags for stroke, still has weakness. · Plan to attend PCP appointment on 1/23/2020. Plan for Next Visit:  1. Assess compliance with medications and understanding of chronic conditions  2. Assess understanding of plan of care and health-seeking behavior changes  3.  Assess need for education and barriers to care  Evaluation:  ACM To follow up in seven-ten days   Patient given contact information for ACM, and office number for twenty-four hour on call coverage ([phone])IY    01/17/20   · Outreach to patient to complete ACM assessment, was able to speak with Eren Cespedes. · According to Eren Cespedes, mother is still losing weight. She will bring her in as fasting to have labs done. Patient not eating good. · BP being monitored 120/62-today  · Patient still gets around pretty good, denies need for supportive equipment  · Denies need for [facility] or [facility]  · Doing better at taking medications, has to be given all meds- has pill box. · Admits to taking medications as ordered with help, ACM reviewed each medication, dosage, timing, route and number of times taken daily  · Addressed discrepancies in administration with patient  · Denies financial, social or behavioral barriers  · ACM contact information given, will follow in 7-10 days. pk    02/13/20  · Outreach to daughter to complete ACM assessment for chronic disease management-HTN, CHF and patient safety- was able to speak with daughter-Shaunna. · Daughter still voices concern about mom not eating good  · Taking protein milk shakes, but sometimes gives her diarrhea  · Has lost about 10 lbs since October  · Continues to get her Prolia injections for her osteoarthritis  · Admits to taking medications as ordered with help, ACM reviewed each medication, dosage, timing, route and number of times taken daily  · Addressed discrepancies in administration with patient  · Denies financial, social or behavioral barriers  · ACM will follow again in 10 days. pk            Patient has Ambulatory Care Manager's contact information for any further questions, concerns, or needs.   Patients upcoming visits:    Future Appointments   Date Time Provider Cory Vazquez   4/30/2020  3:00 PM [facility] NURSE 2 [facility]

## 2020-04-30 ENCOUNTER — HOSPITAL ENCOUNTER (OUTPATIENT)
Dept: INFUSION THERAPY | Age: 85
Discharge: HOME | End: 2020-04-30
Payer: MEDICARE

## 2020-04-30 VITALS
SYSTOLIC BLOOD PRESSURE: 135 MMHG | DIASTOLIC BLOOD PRESSURE: 72 MMHG | RESPIRATION RATE: 16 BRPM | TEMPERATURE: 96.3 F | HEART RATE: 96 BPM

## 2020-04-30 LAB
ALBUMIN SERPL-MCNC: 3.5 G/DL (ref 3.5–5)
ANION GAP SERPL CALC-SCNC: 7 MMOL/L (ref 5–15)
BUN SERPL-MCNC: 26 MG/DL (ref 6–20)
BUN/CREAT SERPL: 21 (ref 12–20)
CALCIUM SERPL-MCNC: 8.2 MG/DL (ref 8.5–10.1)
CHLORIDE SERPL-SCNC: 100 MMOL/L (ref 97–108)
CO2 SERPL-SCNC: 29 MMOL/L (ref 21–32)
CREAT SERPL-MCNC: 1.22 MG/DL (ref 0.55–1.02)
GLUCOSE SERPL-MCNC: 102 MG/DL (ref 65–100)
MAGNESIUM SERPL-MCNC: 1.6 MG/DL (ref 1.6–2.4)
PHOSPHATE SERPL-MCNC: 2.7 MG/DL (ref 2.6–4.7)
PHOSPHATE SERPL-MCNC: 2.8 MG/DL (ref 2.6–4.7)
POTASSIUM SERPL-SCNC: 3.1 MMOL/L (ref 3.5–5.1)
SODIUM SERPL-SCNC: 136 MMOL/L (ref 136–145)

## 2020-04-30 PROCEDURE — 36415 COLL VENOUS BLD VENIPUNCTURE: CPT

## 2020-04-30 PROCEDURE — 84100 ASSAY OF PHOSPHORUS: CPT

## 2020-04-30 PROCEDURE — 80069 RENAL FUNCTION PANEL: CPT

## 2020-04-30 PROCEDURE — 83735 ASSAY OF MAGNESIUM: CPT

## 2020-04-30 NOTE — PROGRESS NOTES
Outpatient Infusion Center Short Visit Progress Note    1500 Pt admit to [facility] for Prolia  ambulatory in stable condition. Assessment completed. Per pt's daughter, pt has dementia. Pt alone in [facility], daughter waiting outside in the building lobby. Explained to pt's daughter that labs would have to be drawn and that the wait time is estimated to be between 1.5-2 hours for lab results; verbalized understanding and would like to return next week for injection and have labs drawn today. No new concerns voiced. Labs drawn peripherally and sent for processing. Please review pending lab results in [address] [place] [address]. Patient Vitals for the past 12 hrs:   Temp Pulse Resp BP   04/30/20 1502 96.3 °F (35.7 °C) 96 16 135/72       1520 Pt tolerated treatment well. D/c home ambulatory in no distress. Pt and daughter aware of next appointment scheduled for 05/07/2020 for Prolia injection.

## 2020-05-01 RX ORDER — POTASSIUM CHLORIDE 750 MG/1
10 TABLET, EXTENDED RELEASE ORAL DAILY
Qty: 90 TAB | Refills: 0 | Status: SHIPPED | OUTPATIENT
Start: 2020-05-01

## 2020-05-01 NOTE — PROGRESS NOTES
Please let her caregiver know I want her to start a daily potassium pill, if she can take it. Also, since she didn't get to follow up with me in April, as planned, could you ask if they have checked her weight recently? I would really like to see her in the office in May (on a Thursday), if they are willing to bring her in. We will recheck her potassium then.

## 2020-05-04 NOTE — PROGRESS NOTES
[phone]- spoke with patients daughter, notified of Dr. Flip Carreon recommendation and understands.  Patient has appointment on Thursday, May 14, 2020 01:15 PM

## 2020-05-07 ENCOUNTER — HOSPITAL ENCOUNTER (OUTPATIENT)
Dept: INFUSION THERAPY | Age: 85
Discharge: HOME | End: 2020-05-07
Payer: MEDICARE

## 2020-05-07 VITALS
SYSTOLIC BLOOD PRESSURE: 134 MMHG | HEART RATE: 94 BPM | TEMPERATURE: 96.2 F | RESPIRATION RATE: 16 BRPM | DIASTOLIC BLOOD PRESSURE: 64 MMHG

## 2020-05-07 PROCEDURE — 96372 THER/PROPH/DIAG INJ SC/IM: CPT

## 2020-05-07 PROCEDURE — 74011250636 HC RX REV CODE- 250/636: Performed by: FAMILY MEDICINE

## 2020-05-07 RX ADMIN — DENOSUMAB 60 MG: 60 INJECTION SUBCUTANEOUS at 11:37

## 2020-05-07 NOTE — PROGRESS NOTES
Outpatient Infusion Center Progress Note    1130 Pt admit to [facility] for Prolia injection ambulatory in stable condition. Assessment completed. Per the daughter patient continues to progress in her dementia and it is difficult getting patient to eat. It is noted that calcium level is low, but corrected calcium is within limits to be treated. Daughter was questioning whether patient should be on any additional calcium supplements. She was told to follow up with PCP regarding that. Patient reports no pain or discomfort. Visit Vitals  /64 (BP 1 Location: Right arm, BP Patient Position: Sitting)   Pulse 94   Temp 96.2 °F (35.7 °C)   Resp 16   Breastfeeding No       Medications Administered     denosumab (PROLIA) injection 60 mg     Admin Date  05/07/2020 Action  Given Dose  60 mg Route  SubCUTAneous Administered By  Ondina Boyd                1140 Pt tolerated treatment well. D/c home ambulatory in no distress. Pt aware of next appointment scheduled.     Azeem Skelton RN

## 2020-05-13 NOTE — PROGRESS NOTES
[facility]  Margarito Keith.   [place], [address]  [phone]             Date of visit: 5/14/2020   Subjective:      History obtained from: caregiver daughter  Tegan Galarza is a 82 y.o. female who presents today for   Chief Complaint   Patient presents with    Labs     discuss results     Follow Up Chronic Condition     Weight loss continues  Just not hungry  Eats breakfast with protein shake in it instead but not much else   They have tried encouraging her with a lot of foods  Breakfast cornflakes, apple sauce, premium protein shake  Some days chicken, tomatoes, green beans  Breakfast is her biggest meal    Diarrhea on and off that is mostly with a larger meal  Maybe only 1-2x per week daughter thinks  No blood  Denies n/v  Not sure of frequency of diarrhea  Doesn't go through many pull ups but uses them as a back up    Dementia moderate to severe but usually cheerful, no behavioral problems   Just sleeping more  Getting in the bed instead of sleeping in the chair  This causes her to miss her evening lipitor and metoprolol at times  Notes her dementia is getting worse    She had the TIA in Sept with a finding of cerebral aneurysm and bilateral carotid stenosis, followed up with Dr. Heath Stroud, neurointerventionalist a couple of times and elected not to have a procedure done as risks outweighed benefits     Had her first prolia injection in October and her second one last week  Calcium was low so gave recommendation for calcium chews  Potassium was low as well     Blood pressure not checked in a while  Family member who was checking it not coming over now due to pandemic    We reduced her synthroid dose in Jan as TSH was low; due to recheck     Patient Active Problem List    Diagnosis Date Noted    Cerebral aneurysm without rupture 09/29/2019    TIA (transient ischemic attack) 09/28/2019    Syncope 09/27/2019    Age-related osteoporosis without current pathological fracture 08/01/2017    Alzheimer's dementia ([facility] 07/05/2017    Carotid stenosis, bilateral 07/05/2017    Glucose intolerance (impaired glucose tolerance) 04/10/2015    Hypothyroidism 01/31/2013    Anemia, chronic disease 01/31/2013    Chronic renal insufficiency, stage III (moderate) ([facility] 01/31/2013    Essential hypertension 01/31/2013    Kyphosis (acquired) (postural) 01/31/2013     Current Outpatient Medications   Medication Sig Dispense Refill    Calcium-Vitamin D3-Vitamin K (Calcium Soft Chew) 500-100-40 mg-unit-mcg chew Take 1 Tab by mouth two (2) times a day.  potassium chloride (KLOR-CON) 10 mEq tablet Take 1 Tab by mouth daily. 90 Tab 0    levothyroxine (SYNTHROID) 75 mcg tablet Take 1 Tab by mouth Daily (before breakfast). Lower dose 90 Tab 1    atorvastatin (LIPITOR) 40 mg tablet Take 1 Tab by mouth daily.  metoprolol succinate (TOPROL-XL) 50 mg XL tablet Take 1 Tab by mouth daily. (replaces previous metoprolol) 90 Tab 1    Blood Pressure Monitor kit Use daily for monitoring of HTN I10 1 Kit 0    irbesartan-hydroCHLOROthiazide (AVALIDE) 150-12.5 mg per tablet TAKE 1 TABLET BY MOUTH EVERY DAY 90 Tab 1    acetaminophen (TYLENOL) 325 mg tablet Take 2 Tabs by mouth every six (6) hours as needed for Pain. 20 Tab 0    aspirin delayed-release (ASPIR-81) 81 mg tablet Take 81 mg by mouth daily.        Allergies   Allergen Reactions    Fosamax [Alendronate] Other (comments)     Bones ached     Past Medical History:   Diagnosis Date    Anemia of chronic disease 11/03    [place]-hem    Carotid artery disease ([facility]     repeat dopplers 3/14    Dehydration     Dementia ([facility]     Goiter     s/p thyroidectomy, benign    Lumbar disc disease     ruptured disc L4-L5 s/p epidural  [place]/os    Osteoporosis, unspecified 12/03    Pure hypercholesterolemia 11/03    TIA (transient ischemic attack)     Unspecified essential hypertension      Past Surgical History:   Procedure Laterality Date    HX COLONOSCOPY      PUCT/ASPIR BREAST CYST,EACH ADDN  2/04    right breast    THYROIDECTOMY  5/10    benign goiter  Brown/surg     Family History   Problem Relation Age of Onset    Hypertension Mother     Hypertension Father      Social History     Tobacco Use    Smoking status: Never Smoker    Smokeless tobacco: Never Used   Substance Use Topics    Alcohol use: No      Social History     Social History Narrative    Lives with her daughter's family            Review of Systems  Gen: denies fever  Card: denies chest pain  Pulm: denies shortness of breath   GI: denies hematochezia      Objective:     Vitals:    05/14/20 1321   BP: 152/57   Pulse: 67   Resp: 17   Temp: 96.5 °F (35.8 °C)   TempSrc: Oral   SpO2: 98%   Weight: 81 lb (36.7 kg)   Height: 5' (1.524 m)     Body mass index is 15.82 kg/m². General: stated age, well developed, very thin; and in NAD  Neck: supple, symmetrical, trachea midline, no adenopathy and thyroid: not enlarged, symmetric, no tenderness/mass/nodules  Lungs:  clear to auscultation w/o rales, rhonchi, wheezes w/normal effort and no use of accessory muscles of respiration   Heart: regular rate and rhythm, S1, S2 normal, no murmur, click, rub or gallop  Abdomen: soft, nontender, no masses, normal bowel sounds  Ext:  No edema noted. Lymph: no cervical adenopathy appreciated  Skin:  Normal. and no rash or abnormalities   Psych: alert and happy affect, speaks in few short sentences      Assessment/Plan:       ICD-10-CM ICD-9-CM    1. Acquired hypothyroidism E03.9 244.9 TSH 3RD GENERATION   2. Age-related osteoporosis without current pathological fracture M81.0 733.01    3. Late onset Alzheimer's disease without behavioral disturbance (HCC) G30.1 331.0     F02.80 294.10    4. Chronic renal insufficiency, stage III (moderate) (HCC) N18.3 585.3    5. TIA (transient ischemic attack) G45.9 435.9    6. Underweight R63.6 783.22    7.  Hypocalcemia E83.51 275.41         Orders Placed This Encounter    TSH 3RD GENERATION    Calcium-Vitamin D3-Vitamin K (Calcium Soft Chew) 500-100-40 mg-unit-mcg chew     Severely underweight but she and family not wanting invasive testing or meds with side effects  She is happy and comfortable  Daughter with good awareness that dementia takes away appetite  Discussed possible treatments for diarrhea but it really is very infrequent  Recheck thyroid since we changed dose  Plan to continue prolia in 6 months    Discussed the diagnosis and plan and she expressed understanding. Follow-up and Dispositions    · Return in about 4 months (around 9/14/2020).          Radha Alfred MD

## 2020-05-13 NOTE — PATIENT INSTRUCTIONS
Osteoporosis: Care Instructions Your Care Instructions Osteoporosis causes bones to become thin and weak. It is much more common in women than in men. Osteoporosis may be very advanced before you know you have it. Sometimes the first sign is a broken bone in the hip, spine, or wrist or sudden pain in your middle or lower back. Follow-up care is a key part of your treatment and safety. Be sure to make and go to all appointments, and call your doctor if you are having problems. It's also a good idea to know your test results and keep a list of the medicines you take. How can you care for yourself at home? · Your doctor may prescribe a bisphosphonate, such as risedronate (Actonel) or alendronate (Fosamax), for osteoporosis. If you are taking one of these medicines by mouth: 
? Take your medicine with a full glass of water when you first get up in the morning. ? Do not lie down, eat, drink a beverage, or take any other medicine for at least 30 minutes after taking the drug. This helps prevent stomach problems. ? Do not take your medicine late in the day if you forgot to take it in the morning. Skip it, and take the usual dose the next morning. ? If you have side effects, tell your doctor. He or she may prescribe another medicine. · Get enough calcium and vitamin D. The [employer] recommends adults younger than age 46 need 1,000 mg of calcium and 600 IU of vitamin D each day. Women ages 46 to 79 need 1,200 mg of calcium and 600 IU of vitamin D each day. Men ages 46 to 79 need 1,000 mg of calcium and 600 IU of vitamin D each day. Adults 71 and older need 1,200 mg of calcium and 800 IU of vitamin D each day. It's not clear if people who already have osteoporosis need more calcium and vitamin D than this. Talk to your doctor about what's right for you. 
? Eat foods rich in calcium, like yogurt, cheese, milk, and dark green vegetables. This is a good way to get the calcium you need.  You can get vitamin D from eggs, fatty fish, cereal, and milk. ? Ask your doctor if you need to take a calcium plus vitamin D supplement. You may be able to get enough calcium and vitamin D through your diet. Be careful with supplements. Adults ages 23 to 48 should not get more than 2,500 mg of calcium and 4,000 IU of vitamin D each day, whether it is from supplements and/or food. Adults ages 46 and older should not get more than 2,000 mg of calcium and 4,000 IU of vitamin D each day from supplements and/or food. · Limit alcohol to 2 drinks a day for men and 1 drink a day for women. Too much alcohol can cause health problems. · Do not smoke. Smoking puts you at a much higher risk for osteoporosis. If you need help quitting, talk to your doctor about stop-smoking programs and medicines. These can increase your chances of quitting for good. · Get regular bone-building exercise. Weight-bearing and resistance exercises keep bones healthy by working the muscles and bones against gravity. Start out at an exercise level that feels right for you. Add a little at a time until you can do the following: ? Do 30 minutes of weight-bearing exercise on most days of the week. Walking, jogging, stair climbing, and dancing are good choices. ? Do resistance exercises with weights or elastic bands 2 to 3 days a week. · Reduce your risk of falls: 
? Wear supportive shoes with low heels and nonslip soles. ? Use a cane or walker, if you need it. Use shower chairs and bath benches. Put in handrails on stairways, around your shower or tub area, and near the toilet. ? Keep stairs, porches, and walkways well lit. Use night-lights. ? Remove throw rugs and other objects that are in the way. ? Avoid icy, wet, or slippery surfaces. ? Keep a cordless phone and a flashlight with new batteries by your bed. When should you call for help? Watch closely for changes in your health, and be sure to contact your doctor if you have any problems. Where can you learn more? Go to http://rocael-chyna.info/ Enter K100 in the search box to learn more about \"Osteoporosis: Care Instructions. \" Current as of: August 6, 2019Content Version: 12.4 © 4851-8463 [employer]. Care instructions adapted under license by [employer] (which disclaims liability or warranty for this information). If you have questions about a medical condition or this instruction, always ask your healthcare professional. [address] any warranty or liability for your use of this information.

## 2020-05-14 ENCOUNTER — HOSPITAL ENCOUNTER (OUTPATIENT)
Dept: LAB | Age: 85
Discharge: HOME | End: 2020-05-14
Payer: MEDICARE

## 2020-05-14 ENCOUNTER — OFFICE VISIT (OUTPATIENT)
Dept: FAMILY MEDICINE CLINIC | Age: 85
End: 2020-05-14

## 2020-05-14 VITALS
TEMPERATURE: 96.5 F | OXYGEN SATURATION: 98 % | WEIGHT: 81 LBS | DIASTOLIC BLOOD PRESSURE: 57 MMHG | HEART RATE: 67 BPM | HEIGHT: 60 IN | SYSTOLIC BLOOD PRESSURE: 152 MMHG | RESPIRATION RATE: 17 BRPM | BODY MASS INDEX: 15.9 KG/M2

## 2020-05-14 DIAGNOSIS — E83.51 HYPOCALCEMIA: ICD-10-CM

## 2020-05-14 DIAGNOSIS — G45.9 TIA (TRANSIENT ISCHEMIC ATTACK): ICD-10-CM

## 2020-05-14 DIAGNOSIS — R63.6 UNDERWEIGHT: ICD-10-CM

## 2020-05-14 DIAGNOSIS — G30.1 LATE ONSET ALZHEIMER'S DISEASE WITHOUT BEHAVIORAL DISTURBANCE (HCC): ICD-10-CM

## 2020-05-14 DIAGNOSIS — M81.0 AGE-RELATED OSTEOPOROSIS WITHOUT CURRENT PATHOLOGICAL FRACTURE: ICD-10-CM

## 2020-05-14 DIAGNOSIS — E03.9 ACQUIRED HYPOTHYROIDISM: Primary | ICD-10-CM

## 2020-05-14 DIAGNOSIS — N18.30 CHRONIC RENAL INSUFFICIENCY, STAGE III (MODERATE) (HCC): ICD-10-CM

## 2020-05-14 DIAGNOSIS — F02.80 LATE ONSET ALZHEIMER'S DISEASE WITHOUT BEHAVIORAL DISTURBANCE (HCC): ICD-10-CM

## 2020-05-14 PROCEDURE — 84443 ASSAY THYROID STIM HORMONE: CPT

## 2020-05-14 RX ORDER — ACETAMINOPHEN 160 MG/5ML
1 ELIXIR ORAL 2 TIMES DAILY
COMMUNITY
Start: 2020-05-14

## 2020-05-14 NOTE — PROGRESS NOTES
Chief Complaint   Patient presents with    Labs     discuss results     Follow Up Chronic Condition     1. Have you been to the ER, urgent care clinic since your last visit? Hospitalized since your last visit? No    2. Have you seen or consulted any other health care providers outside of the [address] [place] [address] since your last visit? Include any pap smears or colon screening.  No

## 2020-05-15 LAB — TSH SERPL DL<=0.005 MIU/L-ACNC: 0.09 UIU/ML (ref 0.45–4.5)

## 2020-05-15 RX ORDER — LEVOTHYROXINE SODIUM 50 UG/1
50 TABLET ORAL
Qty: 90 TAB | Refills: 1 | Status: SHIPPED | OUTPATIENT
Start: 2020-05-15 | End: 2020-11-13 | Stop reason: SDUPTHER

## 2020-11-13 RX ORDER — LEVOTHYROXINE SODIUM 50 UG/1
50 TABLET ORAL
Qty: 30 TAB | Refills: 0 | Status: SHIPPED | OUTPATIENT
Start: 2020-11-13

## 2020-11-13 NOTE — TELEPHONE ENCOUNTER
Last Visit: 5/14/20  MD Santos  Next Appointment: Not scheduled- was to recheck thyroid 3-4 months per dose decrease. Previous Refill Encounter(s): 5/15/20  90 + 1  Last fill 8/2/20 so is probably close to out. Requested Prescriptions     Pending Prescriptions Disp Refills    levothyroxine (SYNTHROID) 50 mcg tablet 90 Tab 0     Sig: Take 1 Tab by mouth Daily (before breakfast). Lower dose.

## 2020-11-16 NOTE — TELEPHONE ENCOUNTER
Outbound call to pt. No answer, unable to leave message due to pt's voicemail box being full.      WHEN PATIENT RETURNS CALL PLEASE SCHEDULE AN APPOINTMENT FOR PATIENT IN PERSON

## 2020-12-29 DIAGNOSIS — I10 HYPERTENSION, UNCONTROLLED: ICD-10-CM

## 2020-12-30 RX ORDER — IRBESARTAN AND HYDROCHLOROTHIAZIDE 150; 12.5 MG/1; MG/1
1 TABLET, FILM COATED ORAL DAILY
Qty: 30 TAB | Refills: 0 | Status: SHIPPED | OUTPATIENT
Start: 2020-12-30

## 2020-12-30 NOTE — TELEPHONE ENCOUNTER
Please contact patient for scheduling. Thanks      Last visit 05/14/2020 MD Santos  Next appointment 4 months (09/2020) - Nothing scheduled   Previous refill encounter(s)   10/26/2019 [facility] #90 with 1 refill. Requested Prescriptions     Pending Prescriptions Disp Refills    irbesartan-hydroCHLOROthiazide (AVALIDE) 150-12.5 mg per tablet 30 Tab 0     Sig: Take 1 Tab by mouth daily.

## 2022-03-18 PROBLEM — F02.80 ALZHEIMER'S DEMENTIA (HCC): Status: ACTIVE | Noted: 2017-07-05

## 2022-03-18 PROBLEM — G30.9 ALZHEIMER'S DEMENTIA (HCC): Status: ACTIVE | Noted: 2017-07-05

## 2022-03-19 PROBLEM — I67.1 CEREBRAL ANEURYSM WITHOUT RUPTURE: Status: ACTIVE | Noted: 2019-09-29

## 2022-03-19 PROBLEM — I65.23 CAROTID STENOSIS, BILATERAL: Status: ACTIVE | Noted: 2017-07-05

## 2022-03-19 PROBLEM — M81.0 AGE-RELATED OSTEOPOROSIS WITHOUT CURRENT PATHOLOGICAL FRACTURE: Status: ACTIVE | Noted: 2017-08-01

## 2022-03-19 PROBLEM — G45.9 TIA (TRANSIENT ISCHEMIC ATTACK): Status: ACTIVE | Noted: 2019-09-28

## 2022-03-19 PROBLEM — R55 SYNCOPE: Status: ACTIVE | Noted: 2019-09-27

## 2023-05-10 RX ORDER — METOPROLOL SUCCINATE 50 MG/1
1 TABLET, EXTENDED RELEASE ORAL DAILY
COMMUNITY
Start: 2020-08-03

## 2023-05-10 RX ORDER — ATORVASTATIN CALCIUM 40 MG/1
1 TABLET, FILM COATED ORAL NIGHTLY
COMMUNITY
Start: 2020-08-03

## 2023-05-10 RX ORDER — ASPIRIN 81 MG/1
81 TABLET ORAL DAILY
COMMUNITY

## 2023-05-10 RX ORDER — LEVOTHYROXINE SODIUM 0.05 MG/1
TABLET ORAL
COMMUNITY
Start: 2020-11-13

## 2023-05-10 RX ORDER — IRBESARTAN AND HYDROCHLOROTHIAZIDE 150; 12.5 MG/1; MG/1
1 TABLET, FILM COATED ORAL DAILY
COMMUNITY
Start: 2020-12-30

## 2023-05-10 RX ORDER — ACETAMINOPHEN 325 MG/1
TABLET ORAL EVERY 6 HOURS PRN
COMMUNITY
Start: 2019-10-10

## 2023-05-10 RX ORDER — POTASSIUM CHLORIDE 750 MG/1
TABLET, EXTENDED RELEASE ORAL DAILY
COMMUNITY
Start: 2020-05-01

## 2023-05-10 RX ORDER — BLOOD PRESSURE TEST KIT
KIT MISCELLANEOUS
COMMUNITY
Start: 2019-11-04

## 2023-12-13 NOTE — ED NOTES
MEDICARE WELLNESS VISIT + NOTE    CHIEF COMPLAINT:  Tierra Mcleod presents for her Subsequent Annual Medicare Wellness Visit.   Her additional complaints or concerns are addressed below.      Patient Care Team:  Maik Hernandez DO as PCP - General (Family Practice)        Patient Active Problem List   Diagnosis    Gastroesophageal reflux disease    Cerebral artery occlusion    Essential familial hypercholesterolemia    Primary hypertension    Preop examination    Lumbar radiculopathy    Strep pharyngitis    Basal cell carcinoma of skin    Gait disturbance    Gastroparesis    Vitreous floaters of both eyes    Primary osteoarthritis of left hip    Epiretinal membrane (ERM) of left eye    Age-related nuclear cataract of both eyes    Osteoarthrosis, hip    Primary localized osteoarthritis of left hip    Osteoporosis    Dense breasts    Urinary incontinence    Left hip pain    Abnormal electrocardiogram (ECG) (EKG)    Preop cardiovascular exam    Colon cancer screening    Vertical diplopia         Past Medical History:   Diagnosis Date    HTN (hypertension)     Lumbago          Past Surgical History:   Procedure Laterality Date    Breast lumpectomy      Remv 2nd cataract,corn-scler sectn Bilateral     Total hip replacement           Social History     Tobacco Use    Smoking status: Never    Smokeless tobacco: Never   Vaping Use    Vaping Use: never used   Substance Use Topics    Alcohol use: Yes     Comment: social    Drug use: Never     Family History   Problem Relation Age of Onset    Patient is unaware of any medical problems Mother     Patient is unaware of any medical problems Father          Current Outpatient Medications   Medication Sig Dispense Refill    amLODIPine (NORVASC) 10 MG tablet Take 1 tablet by mouth daily. 90 tablet 1    atorvastatin (LIPITOR) 40 MG tablet Take 1 tablet by mouth at bedtime. 90 tablet 1    lisinopril (ZESTRIL) 10 MG tablet Take 1 tablet by mouth daily. As directed. 90 tablet 1     Report to [facility] cre[facility]. Evelyn VALENZUELA Updated on pt status and 30 min e.t.a.. Nad. Minimal L. Side facial droop noted. Speech clear. omeprazole (PriLOSEC) 40 MG capsule Take 1 capsule by mouth daily. 90 capsule 1    trospium (SANCTURA) 20 MG tablet Take 1 tablet by mouth 2 times daily. 60 tablet 0     No current facility-administered medications for this visit.        The following items on the Medicare Health Risk Assessment were found to be positive  3.) During the past 4 weeks, how would you rate your health?: Fair     6 a.) How many servings of Fruits and Vegetables do you have each day ( 1 serving = 1 piece of fruit, 1/2 cup fruits or vegetables): 1 per day     6 b.) How many servings of High Fiber / Whole Grain Foods to you have each day ( 1 serving = 1 cup cold cereal, 1/2 cup cooked cereal, 1 slice bread): 1 per day     7b.) Do you feel unsteady when standing or walking?: Yes     7c.) Do you worry about falling?: Yes     8.) During the past 4 weeks, has your physical and emotional health limited your social activities with family, friends, neighbors, or other groups?: Moderately     10.) How often do you have trouble taking medicines the way you have been told to take them?: Sometimes I take my prescribed medications     14.) During the past 4 weeks, was someone available to help if you needed and wanted help?: Yes, some         Vision and Hearing screens: Both screenings were offered and patient declined    Advance care planning documents on file - yes    Cognitive/Functional Status: no evidence of cognitive dysfunction by direct observation    Opioid Review: Tierra is not taking opioid medications.    Recent PHQ 2/9 Score:    PHQ 2:  PHQ 2 Score Adult PHQ 2 Score Adult PHQ 2 Interpretation Little interest or pleasure in activity?   12/6/2023   5:18 PM 0 No further screening needed 0       PHQ 9:       DEPRESSION ASSESSMENT/PLAN:  Depression screening is negative no further plan needed.     Body mass index is 27.34 kg/m².    BMI ASSESSMENT/PLAN:  Patient BMI is within normal range.     See Patient Instructions section.   Return in about  1 year (around 12/13/2024) for Medicare Wellness Visit.      OUTPATIENT PROGRESS NOTE    Subjective   Chief Complaint Office Visit ([facility]) and Medicare Wellness Visit    Seen neurosurgeon  Neck surgery recommended  Pt considering  In the mean  time  Need ortho for hip    Lab utd  Meds same  Vac  utd         Medications  Medications were reviewed and updated today.    Histories  I have personally reviewed and updated the patient's past medical, past surgical, family and social histories during today's visit.    Review of Systems   Constitutional:  Negative for appetite change and fatigue.   HENT:  Negative for mouth sores, nosebleeds and sore throat.    Eyes:  Negative for photophobia, discharge, itching and visual disturbance.   Respiratory:  Negative for cough, choking and shortness of breath.    Cardiovascular:  Negative for chest pain and leg swelling.   Gastrointestinal:  Negative for abdominal pain.   Endocrine: Negative for heat intolerance.   Genitourinary:  Negative for decreased urine volume, difficulty urinating and urgency.   Musculoskeletal:  Negative for arthralgias.   Allergic/Immunologic: Negative for food allergies.   Neurological:  Negative for dizziness and weakness.   Psychiatric/Behavioral:  Negative for behavioral problems.        Objective   Visit Vitals  /82   Pulse 93   Resp 18   Ht 5' 2\" (1.575 m)   Wt 67.8 kg (149 lb 7.6 oz)   BMI 27.34 kg/m²     Physical Exam  Constitutional:       General: She is not in acute distress.     Appearance: She is well-developed. She is not diaphoretic.   HENT:      Head: Normocephalic.      Right Ear: External ear normal.      Left Ear: External ear normal.      Mouth/Throat:      Pharynx: No oropharyngeal exudate.   Eyes:      General: No scleral icterus.        Right eye: No discharge.      Pupils: Pupils are equal, round, and reactive to light.   Neck:      Thyroid: No thyromegaly.   Cardiovascular:      Rate and Rhythm: Normal rate and regular  rhythm.      Heart sounds: Normal heart sounds. No murmur heard.  Pulmonary:      Effort: Pulmonary effort is normal.      Breath sounds: Normal breath sounds. No wheezing.   Chest:      Chest wall: No tenderness.   Abdominal:      General: Bowel sounds are normal. There is no distension.      Palpations: Abdomen is soft.      Tenderness: There is no abdominal tenderness.   Musculoskeletal:         General: No tenderness. Normal range of motion.      Cervical back: Normal range of motion and neck supple.   Lymphadenopathy:      Cervical: No cervical adenopathy.   Skin:     General: Skin is warm and dry.      Findings: No rash.   Neurological:      Mental Status: She is alert and oriented to person, place, and time.      Motor: No abnormal muscle tone.      Coordination: Coordination normal.   Psychiatric:         Behavior: Behavior normal.         Thought Content: Thought content normal.         Laboratory  I have reviewed the pertinent laboratory tests. These are the pertinent findings:  Noted    Imaging  I have reviewed the pertinent imaging study reports. These are the pertinent findings:  Mri noted    Assessment & Plan   Diagnoses and associated orders for this visit:  1. Primary osteoarthritis of left hip  -     SERVICE TO ORTHOPEDICS  2. Essential familial hypercholesterolemia  -     amLODIPine Besylate  -     Atorvastatin Calcium  -     Lisinopril  3. Urinary incontinence, unspecified type  4. Cerebral artery occlusion  -     amLODIPine Besylate  -     Atorvastatin Calcium  -     Lisinopril  5. Essential hypertension  -     amLODIPine Besylate  -     Lisinopril  6. Gastroesophageal reflux disease without esophagitis  -     Omeprazole